# Patient Record
Sex: MALE | Race: WHITE | NOT HISPANIC OR LATINO | Employment: OTHER | URBAN - METROPOLITAN AREA
[De-identification: names, ages, dates, MRNs, and addresses within clinical notes are randomized per-mention and may not be internally consistent; named-entity substitution may affect disease eponyms.]

---

## 2017-05-24 RX ORDER — ISOSORBIDE MONONITRATE 30 MG/1
TABLET, EXTENDED RELEASE ORAL
Qty: 90 TABLET | Refills: 0 | Status: SHIPPED | OUTPATIENT
Start: 2017-05-24 | End: 2017-08-29 | Stop reason: SDUPTHER

## 2017-05-25 RX ORDER — PRAVASTATIN SODIUM 80 MG/1
TABLET ORAL
Qty: 30 TABLET | Refills: 1 | Status: SHIPPED | OUTPATIENT
Start: 2017-05-25 | End: 2019-06-07 | Stop reason: CLARIF

## 2017-09-01 ENCOUNTER — TELEPHONE (OUTPATIENT)
Dept: CARDIOLOGY | Facility: CLINIC | Age: 82
End: 2017-09-01

## 2017-09-01 RX ORDER — CLOPIDOGREL BISULFATE 75 MG/1
TABLET ORAL
Qty: 30 TABLET | Refills: 2 | Status: SHIPPED | OUTPATIENT
Start: 2017-09-01 | End: 2018-11-08

## 2017-09-01 RX ORDER — ISOSORBIDE MONONITRATE 30 MG/1
TABLET, EXTENDED RELEASE ORAL
Qty: 90 TABLET | Refills: 0 | Status: SHIPPED | OUTPATIENT
Start: 2017-09-01 | End: 2018-11-08 | Stop reason: ALTCHOICE

## 2017-09-01 RX ORDER — FENOFIBRATE 48 MG/1
TABLET, FILM COATED ORAL
Qty: 30 TABLET | Refills: 1 | Status: SHIPPED | OUTPATIENT
Start: 2017-09-01 | End: 2018-11-08

## 2017-09-01 NOTE — TELEPHONE ENCOUNTER
----- Message from Lucian Groves MD sent at 8/31/2017  4:54 PM CDT -----  Needs appt  ----- Message -----  From: Rajwinder Plascencia LPN  Sent: 8/31/2017   3:06 PM  To: Lucian Groves MD    Returning your call    ----- Message -----  From: Gris Flood  Sent: 8/31/2017  12:36 PM  To: [facility] SANTOS Staff    Returning your call.  Call [phone].

## 2017-09-13 ENCOUNTER — TELEPHONE (OUTPATIENT)
Dept: CARDIOLOGY | Facility: CLINIC | Age: 82
End: 2017-09-13

## 2017-09-13 DIAGNOSIS — Z00.00 ANNUAL PHYSICAL EXAM: Primary | ICD-10-CM

## 2017-09-13 NOTE — TELEPHONE ENCOUNTER
----- Message from Gage Figueroa sent at 9/13/2017 11:37 AM CDT -----  Contact: Rafael  Calling to discuss appointment on Monday and labs. He states he has not been able to get the orders. Please call back [phone] is his cell. Thanks!

## 2018-08-16 ENCOUNTER — TELEPHONE (OUTPATIENT)
Dept: CARDIOLOGY | Facility: CLINIC | Age: 83
End: 2018-08-16

## 2018-08-16 NOTE — TELEPHONE ENCOUNTER
----- Message from Gris Flood sent at 8/15/2018  4:12 PM CDT -----  Returning your call.  Please call patient at [phone].

## 2018-09-04 DIAGNOSIS — Z76.89 ESTABLISHING CARE WITH NEW DOCTOR, ENCOUNTER FOR: Primary | ICD-10-CM

## 2018-09-24 ENCOUNTER — TELEPHONE (OUTPATIENT)
Dept: CARDIOLOGY | Facility: CLINIC | Age: 83
End: 2018-09-24

## 2018-09-24 NOTE — TELEPHONE ENCOUNTER
----- Message from Augusto Elliott sent at 9/24/2018  2:49 PM CDT -----  Type: Needs Medical Advice    Who Called:  Patient    Best Call Back Number: [phone]  Additional Information: Patient calling.  States that he won't be able to make his 3:00 pm appointment on 9/24 and would like to reschedule.  Please call to advise

## 2018-09-24 NOTE — TELEPHONE ENCOUNTER
Rescheduled appt to 10/16 per pt cancelling appt for 9/24. Pt stated he would not make appt on time

## 2018-10-03 ENCOUNTER — TELEPHONE (OUTPATIENT)
Dept: CARDIOLOGY | Facility: CLINIC | Age: 83
End: 2018-10-03

## 2018-10-03 NOTE — TELEPHONE ENCOUNTER
Calling to reschedule appt per Dr Monroy. Provider not in clinic. No answer, left msg for pt to call office

## 2018-11-08 ENCOUNTER — OFFICE VISIT (OUTPATIENT)
Dept: CARDIOLOGY | Facility: CLINIC | Age: 83
End: 2018-11-08
Payer: MEDICARE

## 2018-11-08 VITALS
HEART RATE: 78 BPM | SYSTOLIC BLOOD PRESSURE: 126 MMHG | OXYGEN SATURATION: 89 % | BODY MASS INDEX: 25.97 KG/M2 | DIASTOLIC BLOOD PRESSURE: 60 MMHG | HEIGHT: 66 IN | WEIGHT: 161.63 LBS

## 2018-11-08 DIAGNOSIS — Z98.890 HISTORY OF STENT INSERTION OF RENAL ARTERY: ICD-10-CM

## 2018-11-08 DIAGNOSIS — R06.09 DOE (DYSPNEA ON EXERTION): ICD-10-CM

## 2018-11-08 DIAGNOSIS — Z95.1 S/P CABG X 3: ICD-10-CM

## 2018-11-08 DIAGNOSIS — G47.19 EXCESSIVE DAYTIME SLEEPINESS: ICD-10-CM

## 2018-11-08 DIAGNOSIS — E65 ABDOMINAL OBESITY: ICD-10-CM

## 2018-11-08 DIAGNOSIS — Z76.89 ESTABLISHING CARE WITH NEW DOCTOR, ENCOUNTER FOR: ICD-10-CM

## 2018-11-08 DIAGNOSIS — I10 ESSENTIAL HYPERTENSION: ICD-10-CM

## 2018-11-08 DIAGNOSIS — E11.9 TYPE 2 DIABETES MELLITUS WITHOUT COMPLICATION, WITHOUT LONG-TERM CURRENT USE OF INSULIN: ICD-10-CM

## 2018-11-08 DIAGNOSIS — R09.89 BILATERAL CAROTID BRUITS: ICD-10-CM

## 2018-11-08 DIAGNOSIS — Z95.0 CARDIAC PACEMAKER IN SITU: ICD-10-CM

## 2018-11-08 PROCEDURE — 99999 PR PBB SHADOW E&M-EST. PATIENT-LVL III: CPT | Mod: PBBFAC,,, | Performed by: INTERNAL MEDICINE

## 2018-11-08 PROCEDURE — 93000 ELECTROCARDIOGRAM COMPLETE: CPT | Mod: S$GLB,,, | Performed by: INTERNAL MEDICINE

## 2018-11-08 PROCEDURE — 1101F PT FALLS ASSESS-DOCD LE1/YR: CPT | Mod: CPTII,S$GLB,, | Performed by: INTERNAL MEDICINE

## 2018-11-08 PROCEDURE — 99205 OFFICE O/P NEW HI 60 MIN: CPT | Mod: S$GLB,,, | Performed by: INTERNAL MEDICINE

## 2018-11-08 RX ORDER — AMLODIPINE BESYLATE 5 MG/1
TABLET ORAL
COMMUNITY
End: 2018-11-08 | Stop reason: SDUPTHER

## 2018-11-08 RX ORDER — AMLODIPINE BESYLATE 5 MG/1
TABLET ORAL
Qty: 90 TABLET | Refills: 3 | Status: SHIPPED | OUTPATIENT
Start: 2018-11-08 | End: 2021-02-16 | Stop reason: SDUPTHER

## 2018-11-08 RX ORDER — INSULIN PUMP SYRINGE, 3 ML
EACH MISCELLANEOUS
COMMUNITY
End: 2019-06-07 | Stop reason: CLARIF

## 2018-11-08 RX ORDER — NAPROXEN SODIUM 220 MG/1
81 TABLET, FILM COATED ORAL DAILY
Qty: 90 TABLET | Refills: 3 | Status: SHIPPED | OUTPATIENT
Start: 2018-11-08 | End: 2020-11-18

## 2018-11-08 RX ORDER — METOPROLOL SUCCINATE 50 MG/1
50 TABLET, EXTENDED RELEASE ORAL DAILY
Qty: 90 TABLET | Refills: 3 | Status: SHIPPED | OUTPATIENT
Start: 2018-11-08 | End: 2019-06-07 | Stop reason: CLARIF

## 2018-11-08 RX ORDER — POTASSIUM CHLORIDE 750 MG/1
20 CAPSULE, EXTENDED RELEASE ORAL DAILY
COMMUNITY
End: 2021-02-16 | Stop reason: SDUPTHER

## 2018-11-08 NOTE — PROGRESS NOTES
Subjective:    Patient ID:  Rafael Bocanegra Jr. is a 84 y.o. male who presents for evaluation of Establish Care (CAD)  For CAD post CABG 2015, HTN, S/P PPM  PCP and referred by Dr. CHRISTIANO Roper, see biannually, do labs  Prior cardiologist: Beau Rivera and Germain, last seen over 2 years ago, no recent testing  CT surgeon: Dr. 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    WM figured he need cardiac follow up after over 2 years, no recent testing including pacer check. Concern of some swelling in the left leg and foot for a number of years, actually improved after CABG surgery. ECG shows AV sequential pacing, rate 63. No recent labs, compliant with medications, on only ASA 5 gr daily for antiplatelet Rx and moderated dose statin, 80 mg of Pravastatin. No problem with medications. Exercise regularly, do mental and physical daily for 15-20 minutes, isometric and isokinetic. No CP only rarely and brief, also some CUELLAR with stair climbing, or steep incline. Medications reviewed, on 2 BB, Bystolic and Toprol.        Review of Systems   Constitution: Positive for malaise/fatigue and night sweats. Negative for diaphoresis, fever, weakness and weight gain.   HENT: Positive for hearing loss (bilateral). Negative for nosebleeds and tinnitus.    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 Negative for visual disturbance.   Cardiovascular: Positive for dyspnea on exertion and leg swelling. Negative for chest pain, claudication, cyanosis, irregular heartbeat, near-syncope, orthopnea, palpitations and paroxysmal nocturnal dyspnea.   Respiratory: Positive for snoring. Negative for cough, shortness of breath, sleep disturbances due to breathing and wheezing.         Newry score 13   Endocrine: Positive for cold intolerance. Negative for  "polydipsia and polyuria.   Hematologic/Lymphatic: Does not bruise/bleed easily.   Skin: Negative for color change, flushing, nail changes, poor wound healing and suspicious lesions.   Musculoskeletal: Positive for arthritis, back pain, falls, joint pain, neck pain and stiffness. Negative for gout, joint swelling, muscle cramps, muscle weakness and myalgias.   Gastrointestinal: Positive for flatus and hemorrhoids. Negative for heartburn, hematemesis, hematochezia, melena and nausea.   Genitourinary: Positive for bladder incontinence.   Neurological: Positive for difficulty with concentration, excessive daytime sleepiness and headaches. Negative for disturbances in coordination, dizziness, focal weakness, light-headedness, loss of balance, numbness and vertigo.   Psychiatric/Behavioral: Positive for depression and memory loss. Negative for substance abuse. The patient does not have insomnia and is not nervous/anxious.         Objective:    Physical Exam   Constitutional: He is oriented to person, place, and time. He appears well-developed and well-nourished.   HENT:   Head: Normocephalic.   Eyes: Conjunctivae and EOM are normal. Pupils are equal, round, and reactive to light.   Neck: Normal range of motion. Neck supple. No JVD present. No thyromegaly present.   Circumference 14.5"   Cardiovascular: Normal rate, regular rhythm, normal heart sounds and intact distal pulses. Exam reveals no gallop and no friction rub.   No murmur heard.  Pulses:       Carotid pulses are 2+ on the right side with bruit, and 2+ on the left side with bruit.       Radial pulses are 2+ on the right side, and 2+ on the left side.        Femoral pulses are 2+ on the right side, and 2+ on the left side.       Popliteal pulses are 2+ on the right side, and 2+ on the left side.        Dorsalis pedis pulses are 2+ on the right side, and 2+ on the left side.        Posterior tibial pulses are 2+ on the right side, and 2+ on the left side. " "  Pulmonary/Chest: Effort normal and breath sounds normal. He has no rales. He exhibits no tenderness.   Abdominal: Soft. Bowel sounds are normal. There is no tenderness.   Waist 41.5", hip 40"   Musculoskeletal: Normal range of motion. He exhibits edema (left LE 1+ pitting edema).   Lymphadenopathy:     He has no cervical adenopathy.   Neurological: He is alert and oriented to person, place, and time.   Skin: Skin is warm and dry. No rash noted.         Assessment:       1. Establishing care with new doctor, encounter for    2. Cardiac pacemaker in situ, implanted 9/27/2013, Lon Garzon DR    3. Type 2 diabetes mellitus without complication, without long-term current use of insulin, onset 2010    4. Essential hypertension, onset 1968    5. S/P CABG x 3, 2015, at [facility]    6. History of stent insertion of renal artery, 2016, left    7. Excessive daytime sleepiness    8. Abdominal obesity    9. Bilateral carotid bruits    10. CUELLAR (dyspnea on exertion)         Plan:       Rafael was seen today for establish care.    Diagnoses and all orders for this visit:    Establishing care with new doctor, encounter for  -     EKG 12-lead    Cardiac pacemaker in situ, implanted 9/27/2013, Lon Garzon DR  -     Pacemaker Interrogation; Future    Type 2 diabetes mellitus without complication, without long-term current use of insulin, onset 2010    Essential hypertension, onset 1968  -     metoprolol succinate (TOPROL-XL) 50 MG 24 hr tablet; Take 1 tablet (50 mg total) by mouth once daily.  -     amLODIPine (NORVASC) 5 MG tablet; amlodipine 5 mg tablet   Take 1 tablet every day by oral route as directed.  -     Transthoracic echo (TTE) complete (Cupid Only); Future    S/P CABG x 3, 2015, at [facility]  -     aspirin 81 MG Chew; Take 1 tablet (81 mg total) by mouth once daily.  -     Transthoracic echo (TTE) complete (Cupid Only); Future  -     Stress test with myocardial perfusion (Cupid Only); Future    History of stent insertion of renal " artery, 2016, left  -     aspirin 81 MG Chew; Take 1 tablet (81 mg total) by mouth once daily.    Excessive daytime sleepiness  -     Polysomnogram (CPAP will be added if patient meets diagnostic criteria.); Future    Abdominal obesity    Bilateral carotid bruits  -     US Carotid Bilateral; Future  -     CAR Ultrasound doppler carotid bliateral; Future  -     Stress test with myocardial perfusion (Cupid Only); Future    CUELLAR (dyspnea on exertion)  -     Transthoracic echo (TTE) complete (Cupid Only); Future  -     Stress test with myocardial perfusion (Cupid Only); Future    - All medical issues reviewed, continue current Rx.  - Will get lipid panel and A1C from Dr. Roper  - CV status stable, continue current Rx, all medications reviewed, patient acknowledge good understanding.  - Instruction for Mediterranean diet and heart healthy exercise given.  - Check home blood pressure, 2 days weekly, do 2 readings within 5 minutes in AM and PM, keep log for review.  - Weigh twice weekly, try to lose 1-2 lbs per week  - Highly recommend 30 minutes of exercise daily, can have Sunday off, with 2-3 sessions of muscle strengthening weekly. A  would be very helpful.  - Recommend at least annual cardiovascular evaluation in view of his significant risk factors.  - Phone review / encourage use of [facility]      Total face-to-face time with the patient was 50 minutes and greater than 50% was spent in counseling and coordination of care. The above assessment and plan have been discussed at length. Problem List updated. Asked to bring in all active medications / pills bottles with next visit.

## 2018-11-08 NOTE — PATIENT INSTRUCTIONS

## 2019-06-07 ENCOUNTER — HOSPITAL ENCOUNTER (OUTPATIENT)
Facility: HOSPITAL | Age: 84
Discharge: HOME | End: 2019-06-09
Attending: EMERGENCY MEDICINE | Admitting: INTERNAL MEDICINE
Payer: MEDICARE

## 2019-06-07 DIAGNOSIS — I63.9 CVA (CEREBRAL VASCULAR ACCIDENT): Primary | ICD-10-CM

## 2019-06-07 DIAGNOSIS — R07.9 CHEST PAIN: ICD-10-CM

## 2019-06-07 DIAGNOSIS — R53.1 LEFT-SIDED WEAKNESS: ICD-10-CM

## 2019-06-07 PROBLEM — E11.59 HYPERTENSION ASSOCIATED WITH DIABETES: Status: ACTIVE | Noted: 2018-11-08

## 2019-06-07 PROBLEM — I15.2 HYPERTENSION ASSOCIATED WITH DIABETES: Status: ACTIVE | Noted: 2018-11-08

## 2019-06-07 PROBLEM — N18.30 CKD (CHRONIC KIDNEY DISEASE) STAGE 3, GFR 30-59 ML/MIN: Status: ACTIVE | Noted: 2019-06-07

## 2019-06-07 PROBLEM — N18.30 TYPE 2 DIABETES MELLITUS WITH STAGE 3 CHRONIC KIDNEY DISEASE, WITHOUT LONG-TERM CURRENT USE OF INSULIN: Status: ACTIVE | Noted: 2018-11-08

## 2019-06-07 PROBLEM — I25.10 CAD (CORONARY ARTERY DISEASE): Status: ACTIVE | Noted: 2019-06-07

## 2019-06-07 PROBLEM — E11.22 TYPE 2 DIABETES MELLITUS WITH STAGE 3 CHRONIC KIDNEY DISEASE, WITHOUT LONG-TERM CURRENT USE OF INSULIN: Status: ACTIVE | Noted: 2018-11-08

## 2019-06-07 LAB
ANION GAP SERPL CALC-SCNC: 10 MMOL/L (ref 8–16)
BASOPHILS # BLD AUTO: 0 K/UL (ref 0–0.2)
BASOPHILS NFR BLD: 0.4 % (ref 0–1.9)
BUN SERPL-MCNC: 28 MG/DL (ref 8–23)
CALCIUM SERPL-MCNC: 9.4 MG/DL (ref 8.7–10.5)
CHLORIDE SERPL-SCNC: 106 MMOL/L (ref 95–110)
CO2 SERPL-SCNC: 26 MMOL/L (ref 23–29)
CREAT SERPL-MCNC: 1.2 MG/DL (ref 0.5–1.4)
DIFFERENTIAL METHOD: ABNORMAL
EOSINOPHIL # BLD AUTO: 0.2 K/UL (ref 0–0.5)
EOSINOPHIL NFR BLD: 2.5 % (ref 0–8)
ERYTHROCYTE [DISTWIDTH] IN BLOOD BY AUTOMATED COUNT: 12.8 % (ref 11.5–14.5)
EST. GFR  (AFRICAN AMERICAN): >60 ML/MIN/1.73 M^2
EST. GFR  (NON AFRICAN AMERICAN): 55 ML/MIN/1.73 M^2
GLUCOSE SERPL-MCNC: 84 MG/DL (ref 70–110)
HCT VFR BLD AUTO: 37.2 % (ref 40–54)
HGB BLD-MCNC: 12.7 G/DL (ref 14–18)
LYMPHOCYTES # BLD AUTO: 1 K/UL (ref 1–4.8)
LYMPHOCYTES NFR BLD: 14.6 % (ref 18–48)
MCH RBC QN AUTO: 31.9 PG (ref 27–31)
MCHC RBC AUTO-ENTMCNC: 34.3 G/DL (ref 32–36)
MCV RBC AUTO: 93 FL (ref 82–98)
MONOCYTES # BLD AUTO: 0.3 K/UL (ref 0.3–1)
MONOCYTES NFR BLD: 4 % (ref 4–15)
NEUTROPHILS # BLD AUTO: 5.6 K/UL (ref 1.8–7.7)
NEUTROPHILS NFR BLD: 78.5 % (ref 38–73)
PLATELET # BLD AUTO: 153 K/UL (ref 150–350)
PMV BLD AUTO: 9.9 FL (ref 9.2–12.9)
POTASSIUM SERPL-SCNC: 3.8 MMOL/L (ref 3.5–5.1)
RBC # BLD AUTO: 3.99 M/UL (ref 4.6–6.2)
SODIUM SERPL-SCNC: 142 MMOL/L (ref 136–145)
TROPONIN I SERPL DL<=0.01 NG/ML-MCNC: 0.01 NG/ML (ref 0–0.03)
TROPONIN I SERPL DL<=0.01 NG/ML-MCNC: 0.01 NG/ML (ref 0–0.03)
WBC # BLD AUTO: 7.2 K/UL (ref 3.9–12.7)

## 2019-06-07 PROCEDURE — 83036 HEMOGLOBIN GLYCOSYLATED A1C: CPT

## 2019-06-07 PROCEDURE — 93005 ELECTROCARDIOGRAM TRACING: CPT

## 2019-06-07 PROCEDURE — 25000003 PHARM REV CODE 250: Performed by: EMERGENCY MEDICINE

## 2019-06-07 PROCEDURE — 99285 EMERGENCY DEPT VISIT HI MDM: CPT | Mod: 25

## 2019-06-07 PROCEDURE — 36415 COLL VENOUS BLD VENIPUNCTURE: CPT

## 2019-06-07 PROCEDURE — G0378 HOSPITAL OBSERVATION PER HR: HCPCS

## 2019-06-07 PROCEDURE — 85025 COMPLETE CBC W/AUTO DIFF WBC: CPT

## 2019-06-07 PROCEDURE — 84484 ASSAY OF TROPONIN QUANT: CPT | Mod: 91

## 2019-06-07 PROCEDURE — 94760 N-INVAS EAR/PLS OXIMETRY 1: CPT

## 2019-06-07 PROCEDURE — 80048 BASIC METABOLIC PNL TOTAL CA: CPT

## 2019-06-07 RX ORDER — GLUCAGON 1 MG
1 KIT INJECTION
Status: DISCONTINUED | OUTPATIENT
Start: 2019-06-08 | End: 2019-06-09 | Stop reason: HOSPADM

## 2019-06-07 RX ORDER — PRAVASTATIN SODIUM 20 MG/1
20 TABLET ORAL DAILY
COMMUNITY
End: 2021-02-16 | Stop reason: SDUPTHER

## 2019-06-07 RX ORDER — CARVEDILOL 6.25 MG/1
6.25 TABLET ORAL 2 TIMES DAILY WITH MEALS
Status: DISCONTINUED | OUTPATIENT
Start: 2019-06-07 | End: 2019-06-09 | Stop reason: HOSPADM

## 2019-06-07 RX ORDER — EZETIMIBE 10 MG/1
10 TABLET ORAL DAILY
COMMUNITY
End: 2021-02-16 | Stop reason: SDUPTHER

## 2019-06-07 RX ORDER — ISOSORBIDE MONONITRATE 30 MG/1
30 TABLET, EXTENDED RELEASE ORAL DAILY
Status: DISCONTINUED | OUTPATIENT
Start: 2019-06-08 | End: 2019-06-09 | Stop reason: HOSPADM

## 2019-06-07 RX ORDER — PANTOPRAZOLE SODIUM 40 MG/1
40 TABLET, DELAYED RELEASE ORAL DAILY
Status: DISCONTINUED | OUTPATIENT
Start: 2019-06-08 | End: 2019-06-09 | Stop reason: HOSPADM

## 2019-06-07 RX ORDER — SODIUM CHLORIDE 0.9 % (FLUSH) 0.9 %
10 SYRINGE (ML) INJECTION
Status: DISCONTINUED | OUTPATIENT
Start: 2019-06-07 | End: 2019-06-09 | Stop reason: HOSPADM

## 2019-06-07 RX ORDER — ISOSORBIDE MONONITRATE 30 MG/1
30 TABLET, EXTENDED RELEASE ORAL DAILY
COMMUNITY
End: 2021-02-16 | Stop reason: SDUPTHER

## 2019-06-07 RX ORDER — INSULIN ASPART 100 [IU]/ML
0-5 INJECTION, SOLUTION INTRAVENOUS; SUBCUTANEOUS
Status: DISCONTINUED | OUTPATIENT
Start: 2019-06-08 | End: 2019-06-09 | Stop reason: HOSPADM

## 2019-06-07 RX ORDER — SERTRALINE HYDROCHLORIDE 50 MG/1
200 TABLET, FILM COATED ORAL DAILY
Status: DISCONTINUED | OUTPATIENT
Start: 2019-06-08 | End: 2019-06-09 | Stop reason: HOSPADM

## 2019-06-07 RX ORDER — CARVEDILOL 6.25 MG/1
6.25 TABLET ORAL 2 TIMES DAILY WITH MEALS
COMMUNITY
End: 2021-02-16 | Stop reason: SDUPTHER

## 2019-06-07 RX ORDER — NAPROXEN SODIUM 220 MG/1
81 TABLET, FILM COATED ORAL DAILY
Status: DISCONTINUED | OUTPATIENT
Start: 2019-06-08 | End: 2019-06-09

## 2019-06-07 RX ORDER — IBUPROFEN 200 MG
24 TABLET ORAL
Status: DISCONTINUED | OUTPATIENT
Start: 2019-06-08 | End: 2019-06-09 | Stop reason: HOSPADM

## 2019-06-07 RX ORDER — IBUPROFEN 200 MG
16 TABLET ORAL
Status: DISCONTINUED | OUTPATIENT
Start: 2019-06-08 | End: 2019-06-09 | Stop reason: HOSPADM

## 2019-06-07 RX ORDER — PRAVASTATIN SODIUM 10 MG/1
20 TABLET ORAL DAILY
Status: DISCONTINUED | OUTPATIENT
Start: 2019-06-08 | End: 2019-06-09 | Stop reason: HOSPADM

## 2019-06-07 RX ADMIN — CARVEDILOL 6.25 MG: 6.25 TABLET, FILM COATED ORAL at 09:06

## 2019-06-07 NOTE — ED PROVIDER NOTES
Encounter Date: 6/7/2019    SCRIBE #1 NOTE: I, Jahairachen Guzman, am scribing for, and in the presence of, Diomedes Caro MD.       History     Chief Complaint   Patient presents with    Chest Pain     L arm numbness, L leg weakness today. Some relief of pain with NTG       Time seen by provider: 4:27 PM on 06/07/2019    Rafael Bocanegra Jr. is a 85 y.o. male with a PMHx of DM, CAD, and HTN who presents to the ED with an onset of left arm and leg weakness/numbness, and chest pain which began several weeks ago. Pt also c/o nausea today. He states that it is difficult for him to move his arm and leg. Pt also c/o having waxing and waning chest pain that has been ongoing for two weeks that last for approximately 2 minutes. He states that the chest pain occurs in his left shoulder blade and rib cage. He also complains of SOB secondary to the chest pain. He mentions that walking is difficult secondary to leg weakness. Pt mentions his last dose of NTG was 5 hours ago and did not take any aspirin today. Pt denies having prior left arm weakness. Pt states that his PCP, Dr. Roper, told him to come to the ED to have his symptoms evaluated. Pt denies diaphoresis, vomiting, abdominal pain, back pain, facial weakness, or any other symptoms at this time. Pertinent PSHx of a pacemaker, CABG (performed in 2015), stents (placed 2016)  noted. NKDA noted.          The history is provided by the patient.     Review of patient's allergies indicates:  No Known Allergies  Past Medical History:   Diagnosis Date    Anemia     Anticoagulant long-term use     Arthritis     Coronary artery disease     Diabetes mellitus     Encounter for blood transfusion     Hypertension     Kidney disease, chronic, stage III (GFR 30-59 ml/min)      Past Surgical History:   Procedure Laterality Date    carotid endartarectomy      left    COLONOSCOPY N/A 1/17/2014    [facility]    COLONOSCOPY N/A 6/29/2012     [facility]    CORONARY ARTERY BYPASS GRAFT      CORONARY STENT PLACEMENT      EGD (ESOPHAGOGASTRODUODENOSCOPY) N/A 2012    [facility]    INSERT / REPLACE / REMOVE PACEMAKER       Family History   Problem Relation Age of Onset    Cancer Sister      Social History     Tobacco Use    Smoking status: Former Smoker     Packs/day: 2.00     Years: 20.00     Pack years: 40.00     Last attempt to quit: 1975     Years since quittin.9    Smokeless tobacco: Never Used   Substance Use Topics    Alcohol use: No    Drug use: No     Review of Systems   Constitutional: Negative for diaphoresis and fever.   HENT: Negative for sore throat.    Respiratory: Positive for shortness of breath (secondary to chest pain).    Cardiovascular: Positive for chest pain (waxing and waning).   Gastrointestinal: Positive for nausea. Negative for abdominal pain and vomiting.   Genitourinary: Negative for dysuria.   Musculoskeletal: Negative for back pain.   Skin: Negative for rash.   Neurological: Positive for weakness (left leg and left arm) and numbness (left leg and left arm). Negative for facial asymmetry.   Hematological: Does not bruise/bleed easily.       Physical Exam     Initial Vitals [19 1604]   BP Pulse Resp Temp SpO2   (!) 180/75 76 16 98.1 °F (36.7 °C) 97 %      MAP       --         Physical Exam    Nursing note and vitals reviewed.  Constitutional: He appears well-developed and well-nourished. He is not diaphoretic. No distress.   HENT:   Head: Normocephalic and atraumatic.   Nose: Nose normal.   Mouth/Throat: Oropharynx is clear and moist.   Eyes: Conjunctivae and EOM are normal. Pupils are equal, round, and reactive to light. No scleral icterus.   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 Poor vision in right eye which is old.    Neck: Normal range of motion. Neck supple. No JVD present.   Cardiovascular: Normal rate,  regular rhythm and normal heart sounds. Exam reveals no gallop and no friction rub.    No murmur heard.  Pulses:       Radial pulses are 2+ on the right side, and 2+ on the left side.        Dorsalis pedis pulses are 2+ on the right side, and 2+ on the left side.        Posterior tibial pulses are 2+ on the right side, and 2+ on the left side.   Pulmonary/Chest: Breath sounds normal. No stridor. No respiratory distress. He has no wheezes. He exhibits no tenderness.   Abdominal: Soft. Bowel sounds are normal. He exhibits no distension and no mass. There is no tenderness. There is no rebound and no guarding.   Musculoskeletal: Normal range of motion. He exhibits no edema or tenderness.        Cervical back: Normal.        Thoracic back: Normal.        Lumbar back: Normal.        Right lower leg: He exhibits no edema.        Left lower leg: He exhibits no edema.   No peripheral edema.   Lymphadenopathy:     He has no cervical adenopathy.   Neurological: He is alert and oriented to person, place, and time. He has normal strength. No cranial nerve deficit.   No focal neurological deficits noted. Cranial nerves III-XII grossly intact. Equal, rapid alternating movements noted to bilateral upper and lower extremities.   Mild left arm pronator drift without falling to the bed. Left leg drift with falling to the bed. Decreased subjective sensation of light touch to left arm and leg. Normal speech.   Skin: Skin is warm and dry. No rash noted. No pallor.   Psychiatric: He has a normal mood and affect. Thought content normal.         ED Course   Procedures  Labs Reviewed   CBC W/ AUTO DIFFERENTIAL - Abnormal; Notable for the following components:       Result Value    RBC 3.99 (*)     Hemoglobin 12.7 (*)     Hematocrit 37.2 (*)     Mean Corpuscular Hemoglobin 31.9 (*)     Gran% 78.5 (*)     Lymph% 14.6 (*)     All other components within normal limits   BASIC METABOLIC PANEL - Abnormal; Notable for the following components:     BUN, Bld 28 (*)     eGFR if non  55 (*)     All other components within normal limits   TROPONIN I          Imaging Results          CT Head Without Contrast (Final result)  Result time 06/07/19 17:37:52    Final result by Devon Pritchard MD (06/07/19 17:37:52)                 Impression:      No acute intracranial process.  Mild chronic white matter change.  Atherosclerosis.      Electronically signed by: Devon Pritchard MD  Date:    06/07/2019  Time:    17:37             Narrative:    EXAMINATION:  CT HEAD WITHOUT CONTRAST    CLINICAL HISTORY:  Focal neuro deficit, new, fixed or worsening, >6 hours;    TECHNIQUE:  Low dose axial images were obtained through the head.  Coronal and sagittal reformations were also performed. Contrast was not administered.    COMPARISON:  None.    FINDINGS:  Gray-white differentiation is well maintained.  There is no intracranial hemorrhage.  There is no mass or midline shift.  The ventricles are nondilated.  There is mild patchy white matter hypoattenuation which is typically chronic.  Mild generalized cerebral volume loss is present.  There is heavy calcification of the intracranial ICAs.  The calvarium is intact.                               X-Ray Chest 1 View (Final result)  Result time 06/07/19 17:03:46    Final result by Devon Pritchard MD (06/07/19 17:03:46)                 Impression:      Left pacer, CABG, and cardiomegaly.  Mild right basilar atelectasis.      Electronically signed by: Devon Pritchard MD  Date:    06/07/2019  Time:    17:03             Narrative:    EXAMINATION:  XR CHEST 1 VIEW    CLINICAL HISTORY:  CP;    TECHNIQUE:  Single frontal view of the chest was performed.    COMPARISON:  None    FINDINGS:  A left-sided triple lead cardiac pacer is present.  There are postoperative changes of CABG.  The heart is enlarged.  There is mild right basilar atelectasis.  No consolidation or pleural effusion.                             (radiology reading, visualized by me)     Medical Decision Making:   History:   Old Medical Records: I decided to obtain old medical records.  Clinical Tests:   Lab Tests: Ordered and Reviewed  Radiological Study: Ordered and Reviewed  Medical Tests: Ordered and Reviewed  Other:   I have discussed this case with another health care provider.            Scribe Attestation:   Scribe #1: I performed the above scribed service and the documentation accurately describes the services I performed. I attest to the accuracy of the note.    I, Dr. Diomedes Caro, personally performed the services described in this documentation. All medical record entries made by the scribe were at my direction and in my presence.  I have reviewed the chart and agree that the record reflects my personal performance and is accurate and complete. Diomedes Caro MD.  9:34 PM 06/07/2019    Rafael Bocanegra Jr. is a 85 y.o. male presenting with multiple complaints all subacute in timing of several weeks of vague chest pressure along with left-sided weakness and numbness.  Patient is not a tPA or intravascular candidate given protracted nature with gradual onset of symptoms. I did speak with Cardiology who does not feel the patient requires transfer for proximity to cardiac catheterization lab.  I feel this is reasonable in this was also discussed with the patient.  EKG unchanged and troponin notably is negative.  I have low suspicion for PE.  Aortic dissection unlikely given time course and I do not think CT angiography of the chest is indicated.  He does have apparent left-sided weakness objective on exam and would benefit from Neurology evaluation.  Head CT shows no sign of intracranial lesion or hemorrhage. I have discussed with hospital medicine who will assume care.        ED Course as of Jun 07 1829 Fri Jun 07, 2019   1625 EKG:  AV dual-paced rhythm similar to prior, rate of 78, wide QRS.  Negative Sgarbossa criteria for ACS with  appropriate discordance.  R axis.    [MR]   1700 CXR:  Pacer in place, NAD, narrow mediastinum. (my read)    [MR]   1812 D/w Dr. Dudley who recommends no need for transfer for immediate cardiac catheterization plan.  Admit here for medical workup with hospital medicine, neuro.    [MR]      ED Course User Index  [MR] Diomedes Caro MD     Clinical Impression:       ICD-10-CM ICD-9-CM   1. Left-sided weakness R53.1 728.87   2. Chest pain R07.9 786.50         Disposition:   Disposition: Admitted                        Diomedes Caro MD  06/07/19 [id number]

## 2019-06-08 LAB
ALBUMIN SERPL BCP-MCNC: 3.6 G/DL (ref 3.5–5.2)
ALP SERPL-CCNC: 59 U/L (ref 55–135)
ALT SERPL W/O P-5'-P-CCNC: 22 U/L (ref 10–44)
ANION GAP SERPL CALC-SCNC: 12 MMOL/L (ref 8–16)
APTT BLDCRRT: 30.8 SEC (ref 21–32)
AST SERPL-CCNC: 22 U/L (ref 10–40)
BASOPHILS # BLD AUTO: 0 K/UL (ref 0–0.2)
BASOPHILS NFR BLD: 0.2 % (ref 0–1.9)
BILIRUB SERPL-MCNC: 0.5 MG/DL (ref 0.1–1)
BILIRUB UR QL STRIP: NEGATIVE
BUN SERPL-MCNC: 23 MG/DL (ref 8–23)
CALCIUM SERPL-MCNC: 9.5 MG/DL (ref 8.7–10.5)
CHLORIDE SERPL-SCNC: 108 MMOL/L (ref 95–110)
CHOLEST SERPL-MCNC: 183 MG/DL (ref 120–199)
CHOLEST/HDLC SERPL: 4.5 {RATIO} (ref 2–5)
CK MB SERPL-MCNC: 2.3 NG/ML (ref 0.1–6.5)
CK MB SERPL-RTO: 2.6 % (ref 0–5)
CK SERPL-CCNC: 88 U/L (ref 20–200)
CLARITY UR: CLEAR
CO2 SERPL-SCNC: 24 MMOL/L (ref 23–29)
COLOR UR: YELLOW
CREAT SERPL-MCNC: 1 MG/DL (ref 0.5–1.4)
DIFFERENTIAL METHOD: ABNORMAL
EOSINOPHIL # BLD AUTO: 0.2 K/UL (ref 0–0.5)
EOSINOPHIL NFR BLD: 2.6 % (ref 0–8)
ERYTHROCYTE [DISTWIDTH] IN BLOOD BY AUTOMATED COUNT: 13 % (ref 11.5–14.5)
EST. GFR  (AFRICAN AMERICAN): >60 ML/MIN/1.73 M^2
EST. GFR  (NON AFRICAN AMERICAN): >60 ML/MIN/1.73 M^2
ESTIMATED AVG GLUCOSE: 128 MG/DL (ref 68–131)
GLUCOSE SERPL-MCNC: 88 MG/DL (ref 70–110)
GLUCOSE UR QL STRIP: NEGATIVE
HBA1C MFR BLD HPLC: 6.1 % (ref 4–5.6)
HCT VFR BLD AUTO: 41.5 % (ref 40–54)
HDLC SERPL-MCNC: 41 MG/DL (ref 40–75)
HDLC SERPL: 22.4 % (ref 20–50)
HGB BLD-MCNC: 13.8 G/DL (ref 14–18)
HGB UR QL STRIP: NEGATIVE
INR PPP: 1 (ref 0.8–1.2)
KETONES UR QL STRIP: NEGATIVE
LDLC SERPL CALC-MCNC: 112.4 MG/DL (ref 63–159)
LEUKOCYTE ESTERASE UR QL STRIP: NEGATIVE
LYMPHOCYTES # BLD AUTO: 1.2 K/UL (ref 1–4.8)
LYMPHOCYTES NFR BLD: 13 % (ref 18–48)
MAGNESIUM SERPL-MCNC: 1.9 MG/DL (ref 1.6–2.6)
MCH RBC QN AUTO: 31.5 PG (ref 27–31)
MCHC RBC AUTO-ENTMCNC: 33.3 G/DL (ref 32–36)
MCV RBC AUTO: 95 FL (ref 82–98)
MONOCYTES # BLD AUTO: 0.4 K/UL (ref 0.3–1)
MONOCYTES NFR BLD: 4 % (ref 4–15)
NEUTROPHILS # BLD AUTO: 7.4 K/UL (ref 1.8–7.7)
NEUTROPHILS NFR BLD: 80.2 % (ref 38–73)
NITRITE UR QL STRIP: NEGATIVE
NONHDLC SERPL-MCNC: 142 MG/DL
PH UR STRIP: 8 [PH] (ref 5–8)
PHOSPHATE SERPL-MCNC: 2.6 MG/DL (ref 2.7–4.5)
PLATELET # BLD AUTO: 159 K/UL (ref 150–350)
PMV BLD AUTO: 10.1 FL (ref 9.2–12.9)
POCT GLUCOSE: 120 MG/DL (ref 70–110)
POCT GLUCOSE: 141 MG/DL (ref 70–110)
POTASSIUM SERPL-SCNC: 3.6 MMOL/L (ref 3.5–5.1)
PROT SERPL-MCNC: 6.7 G/DL (ref 6–8.4)
PROT UR QL STRIP: NEGATIVE
PROTHROMBIN TIME: 10.7 SEC (ref 9–12.5)
RBC # BLD AUTO: 4.39 M/UL (ref 4.6–6.2)
SODIUM SERPL-SCNC: 144 MMOL/L (ref 136–145)
SP GR UR STRIP: 1.01 (ref 1–1.03)
TRIGL SERPL-MCNC: 148 MG/DL (ref 30–150)
TROPONIN I SERPL DL<=0.01 NG/ML-MCNC: 0.02 NG/ML (ref 0–0.03)
TSH SERPL DL<=0.005 MIU/L-ACNC: 3 UIU/ML (ref 0.4–4)
URN SPEC COLLECT METH UR: NORMAL
UROBILINOGEN UR STRIP-ACNC: NEGATIVE EU/DL
WBC # BLD AUTO: 9.3 K/UL (ref 3.9–12.7)

## 2019-06-08 PROCEDURE — 99900039 HC SLP GENERIC THERAPY SCREENING (STAT)

## 2019-06-08 PROCEDURE — 92610 EVALUATE SWALLOWING FUNCTION: CPT

## 2019-06-08 PROCEDURE — G9168 MEMORY CURRENT STATUS: HCPCS | Mod: CJ

## 2019-06-08 PROCEDURE — G0378 HOSPITAL OBSERVATION PER HR: HCPCS

## 2019-06-08 PROCEDURE — 25000003 PHARM REV CODE 250: Performed by: EMERGENCY MEDICINE

## 2019-06-08 PROCEDURE — G8996 SWALLOW CURRENT STATUS: HCPCS | Mod: CH

## 2019-06-08 PROCEDURE — 84100 ASSAY OF PHOSPHORUS: CPT

## 2019-06-08 PROCEDURE — 25500020 PHARM REV CODE 255: Performed by: HOSPITALIST

## 2019-06-08 PROCEDURE — 82550 ASSAY OF CK (CPK): CPT

## 2019-06-08 PROCEDURE — 85610 PROTHROMBIN TIME: CPT

## 2019-06-08 PROCEDURE — 97802 MEDICAL NUTRITION INDIV IN: CPT

## 2019-06-08 PROCEDURE — 94761 N-INVAS EAR/PLS OXIMETRY MLT: CPT

## 2019-06-08 PROCEDURE — 83735 ASSAY OF MAGNESIUM: CPT

## 2019-06-08 PROCEDURE — 36415 COLL VENOUS BLD VENIPUNCTURE: CPT

## 2019-06-08 PROCEDURE — G9170 MEMORY D/C STATUS: HCPCS | Mod: CJ

## 2019-06-08 PROCEDURE — 80053 COMPREHEN METABOLIC PANEL: CPT

## 2019-06-08 PROCEDURE — G9169 MEMORY GOAL STATUS: HCPCS | Mod: CJ

## 2019-06-08 PROCEDURE — 25000003 PHARM REV CODE 250: Performed by: NURSE PRACTITIONER

## 2019-06-08 PROCEDURE — 84443 ASSAY THYROID STIM HORMONE: CPT

## 2019-06-08 PROCEDURE — 85025 COMPLETE CBC W/AUTO DIFF WBC: CPT

## 2019-06-08 PROCEDURE — G8997 SWALLOW GOAL STATUS: HCPCS | Mod: CH

## 2019-06-08 PROCEDURE — 81003 URINALYSIS AUTO W/O SCOPE: CPT

## 2019-06-08 PROCEDURE — 92523 SPEECH SOUND LANG COMPREHEN: CPT

## 2019-06-08 PROCEDURE — G8998 SWALLOW D/C STATUS: HCPCS | Mod: CH

## 2019-06-08 PROCEDURE — 82553 CREATINE MB FRACTION: CPT

## 2019-06-08 PROCEDURE — 84484 ASSAY OF TROPONIN QUANT: CPT

## 2019-06-08 PROCEDURE — 80061 LIPID PANEL: CPT

## 2019-06-08 PROCEDURE — 85730 THROMBOPLASTIN TIME PARTIAL: CPT

## 2019-06-08 RX ADMIN — MULTIPLE VITAMINS W/ MINERALS TAB 1 TABLET: TAB at 09:06

## 2019-06-08 RX ADMIN — SERTRALINE HYDROCHLORIDE 200 MG: 50 TABLET ORAL at 09:06

## 2019-06-08 RX ADMIN — IOHEXOL 75 ML: 350 INJECTION, SOLUTION INTRAVENOUS at 12:06

## 2019-06-08 RX ADMIN — PRAVASTATIN SODIUM 20 MG: 10 TABLET ORAL at 09:06

## 2019-06-08 RX ADMIN — PANTOPRAZOLE SODIUM 40 MG: 40 TABLET, DELAYED RELEASE ORAL at 09:06

## 2019-06-08 RX ADMIN — ISOSORBIDE MONONITRATE 30 MG: 30 TABLET, EXTENDED RELEASE ORAL at 09:06

## 2019-06-08 RX ADMIN — CARVEDILOL 6.25 MG: 6.25 TABLET, FILM COATED ORAL at 04:06

## 2019-06-08 RX ADMIN — CARVEDILOL 6.25 MG: 6.25 TABLET, FILM COATED ORAL at 09:06

## 2019-06-08 RX ADMIN — ASPIRIN 81 MG CHEWABLE TABLET 81 MG: 81 TABLET CHEWABLE at 09:06

## 2019-06-08 NOTE — ASSESSMENT & PLAN NOTE
Chronic, uncontrolled.  Latest blood pressure and vitals reviewed-   Temp:  [96.4 °F (35.8 °C)-98.1 °F (36.7 °C)]   Pulse:  [76-99]   Resp:  [16-17]   BP: (161-204)/(64-96)   SpO2:  [90 %-99 %] .   Allow for permissive hypertension until stroke ruled out.

## 2019-06-08 NOTE — ASSESSMENT & PLAN NOTE
Chronic/Uncontrolled  Allow for permissive HTN, SBP <220, until CVA is ruled out  Hold chronic medications for now

## 2019-06-08 NOTE — PT/OT/SLP PROGRESS
Physical Therapy      Patient Name:  Rafael Bocanegra Jr.   MRN:  [id number]    PT eval attempted- pt refusing PT services. Explained purpose of therapy eval on stroke pt but still refusing. Dr Sotelo and nurse Aneta jackson.    Jenn Cole, PT

## 2019-06-08 NOTE — ASSESSMENT & PLAN NOTE
Creatine stable for now. BMP reviewed- noted Estimated Creatinine Clearance: 48.7 mL/min (based on SCr of 1 mg/dL). according to latest data. Monitor UOP and serial BMP and adjust therapy as needed. Renally dose meds.

## 2019-06-08 NOTE — CONSULTS
"  [facility]  Adult Nutrition  Consult Note    SUMMARY     Recommendations    Recommendation: 1. Consider adding diabetic restriction to current diet. 2. If PO intake < 50 %, add boost glucose control TID. 3. Onsite RD to f/u w/ interview, NFPE & assess education needs.  Goals: Meet > 85 % EEN/ EPN while admitted  Nutrition Goal Status: new  Communication of RD Recs: (POC, sticky note)    Reason for Assessment    Reason For Assessment: consult   Dx:   1. Left-sided weakness    2. Chest pain    3. CVA (cerebral vascular accident)    Hx: CAD, DM, HTN  General info comments: Remote coverage x nutrition services today. Unable to reach patient via telephone. Pt admitted w/ c/o left sided weakness. Per ST recs: regular diet w/ thin liquids. Currently on cardiac diet. No known intake since admit. Per epic records, pt weighed 161 lbs on 11/8/18, current wt= 161 lbs ( no wt changes within the last 7 months). Unable to perform NFPE @ this time due to remove coverage.    Discharge plan: Cardiac Diabetic diet      Nutrition Risk Screen    Nutrition Risk Screen: no indicators present    Nutrition/Diet History    Spiritual, Cultural Beliefs, Orthodox Practices, Values that Affect Care: no    Anthropometrics    Temp: 98.1 °F (36.7 °C)  Height Method: Stated  Height: 5' 6"  Height (inches): 66 in  Weight Method: Standard Scale  Weight: 73.3 kg (161 lb 9.6 oz)  Weight (lb): 161.6 lb  Ideal Body Weight (IBW), Male: 142 lb  % Ideal Body Weight, Male (lb): 113.8 lb  BMI (Calculated): 26.1  BMI Grade: 25 - 29.9 - overweight       Lab/Procedures/Meds    Pertinent Labs Reviewed: reviewed  BMP  Lab Results   Component Value Date     06/08/2019    K 3.6 06/08/2019     06/08/2019    CO2 24 06/08/2019    BUN 23 06/08/2019    CREATININE 1.0 06/08/2019    CALCIUM 9.5 06/08/2019    ANIONGAP 12 06/08/2019    ESTGFRAFRICA >60 06/08/2019    EGFRNONAA >60 06/08/2019     Lab Results   Component Value Date    " CALCIUM 9.5 06/08/2019    PHOS 2.6 (L) 06/08/2019     Lab Results   Component Value Date    ALBUMIN 3.6 06/08/2019     Recent Labs   Lab 06/08/19  1048   POCTGLUCOSE 120*     No results found for: LABA1C, HGBA1C    Pertinent Medications Reviewed: reviewed    Physical Findings/Assessment     skin: Mynor score  19     Estimated/Assessed Needs    Weight Used For Calorie Calculations: 73.3 kg (161 lb 9.6 oz)  Energy Calorie Requirements (kcal): 1700  Energy Need Method: Romney-St Jeor(x1.25)  Protein Requirements: 88 g  Weight Used For Protein Calculations: 73.3 kg (161 lb 9.6 oz)     Estimated Fluid Requirement Method: RDA Method(or per MD)  RDA Method (mL): 1700  CHO Requirement: 50 % EEN      Nutrition Prescription Ordered    Nutrition Order Comments: cardiac diet    Evaluation of Received Nutrient/Fluid Intake          Intake/Output Summary (Last 24 hours) at 6/8/2019 1256  Last data filed at 6/8/2019 0438  Gross per 24 hour   Intake 240 ml   Output 975 ml   Net -735 ml       % Intake of Estimated Energy Needs: Other: JESSICA  % Meal Intake: Other: JESSICA    Nutrition Risk      2xweekly    Assessment and Plan    Nutrition Problem  Decreased nutrient needs (sodium, fat)     Related to (etiology):   Current medical condition and past medical hx     Signs and Symptoms (as evidenced by):   CVA, hx of  CAD, HTN    Interventions/Recommendations (treatment strategy):  See above     Nutrition Diagnosis Status:   New        Monitor and Evaluation    Food and Nutrient Intake: energy intake, food and beverage intake  Food and Nutrient Adminstration: diet order  Anthropometric Measurements: weight  Biochemical Data, Medical Tests and Procedures: electrolyte and renal panel, glucose/endocrine profile  Nutrition-Focused Physical Findings: overall appearance     Malnutrition Assessment               to be completed at f/u                        Nutrition Follow-Up    RD Follow-up?: Yes

## 2019-06-08 NOTE — ASSESSMENT & PLAN NOTE
Likely musculoskeletal etiology.  Troponins are negative.  Monitor on telemetry  Continue ASA and statin

## 2019-06-08 NOTE — ASSESSMENT & PLAN NOTE
Patient's FSGs are controlled on current hypoglycemics.   Last A1c reviewed- No results found for: LABA1C, HGBA1C  Most recent fingerstick glucose reviewed- No results for input(s): POCTGLUCOSE in the last 24 hours.  Current correctional scale low  Maintain anti-hyperglycemic dose as follows-   Antihyperglycemics (From admission, onward)    Start     Stop Route Frequency Ordered    06/08/19 0027  insulin aspart U-100 pen 0-5 Units      -- SubQ Before meals & nightly PRN 06/07/19 5582

## 2019-06-08 NOTE — PROGRESS NOTES
SW met with patient to complete discharge planning assessment. Patient presents as alert and oriented x 4, pleasant, good eye contact, well groomed, recall good, concentration/judgement good, average intelligence, calm, communicative, cooperative and asking and answering questions appropriately. Pt reports that he lives alone with his parrot. Pt reports that his family is scattered throughout the [place] Pt reports that his [employer] family or  (Cindy Dunlap) can assist as needed. Pt will continue to be followed for any continuity of care issues, discharge planning, and emotional support. SW remains available.       06/08/19 7545   Discharge Assessment   Assessment Type Discharge Planning Assessment   Confirmed/corrected address and phone number on facesheet? Yes   Assessment information obtained from? Patient   Prior to hospitilization cognitive status: Alert/Oriented   Prior to hospitalization functional status: Independent   Current cognitive status: Alert/Oriented   Current Functional Status: Assistive Equipment   Lives With alone   Able to Return to Prior Arrangements yes   Is patient able to care for self after discharge? Yes   Patient's perception of discharge disposition home or selfcare   Readmission Within the Last 30 Days no previous admission in last 30 days   Patient currently being followed by outpatient case management? No   Patient currently receives any other outside agency services? No   Equipment Currently Used at Home none   Do you have any problems affording any of your prescribed medications? No   Is the patient taking medications as prescribed? yes   Does the patient have transportation home? Yes   Transportation Anticipated family or friend will provide   Does the patient receive services at the [facility]? No   Discharge Plan A Home   Discharge Plan B Home;Home Health   DME Needed Upon Discharge  walker, rolling;cane, quad   Patient/Family in Agreement with Plan yes

## 2019-06-08 NOTE — CONSULTS
[facility]  Neurology  Consult Note    Patient Name: Rafael Bocanegra Jr.  MRN: [id number]  Admission Date: 6/7/2019  Hospital Length of Stay: 0 days  Code Status: Full Code   Attending Provider: Lilian Sotelo MD   Consulting Provider: Mya Blanca NP  Primary Care Physician: Rolando Roper MD  Principal Problem:CVA (cerebral vascular accident)    Inpatient consult to Neurology  Consult performed by: Mya Blanca NP  Consult ordered by: Diomedes Caro MD        Subjective:     Chief Complaint:  Left side weakness and numbness    HPI: Patient presented with L side weakness and numbness. Patient reports he has been experiencing numbness since when he fell a couple weeks ago. Yesterday he reports he noticed weakness to his L side after taking a shower and then was dragging his leg when he walked. Denies slurred speech or droopy face.     Current Neurological Medications: reviewed    Brain Imaging:  CTA head: There is mild atherosclerosis in the cavernous segments as well as in the right V4 segment of the vertebral artery.  There is, however, no critical stenosis, occlusion, thrombosis or dissection involving the vessels which comprise the anterior and posterior circulation. There is no large aneurysm or other vascular malformation.    CT head: No acute intracranial process.  Mild chronic white matter change.  Atherosclerosis.    Neck imaging:  CT neck: 1. There is extensive multilevel degenerative disc and facet disease resulting in multilevel foraminal stenosis ranging from mild to severe.  Also, there is some degree of spinal stenosis at multiple levels.  These findings are discussed in detail by level above there is no fracture or malalignment.    Cardiac Imaging:  CUS: no significant stenosis    ECHO: P    Labs:   Crt: 1  LDL: 112.4  HA1C: P    Past Medical History:   Diagnosis Date    Anemia     Anticoagulant long-term use     Arthritis     Coronary artery disease     Diabetes  mellitus     Encounter for blood transfusion     Hypertension     Kidney disease, chronic, stage III (GFR 30-59 ml/min)        Past Surgical History:   Procedure Laterality Date    carotid endartarectomy      left    COLONOSCOPY N/A 1/17/2014    [facility]    COLONOSCOPY N/A 6/29/2012    [facility]    CORONARY ARTERY BYPASS GRAFT      CORONARY STENT PLACEMENT      EGD (ESOPHAGOGASTRODUODENOSCOPY) N/A 6/29/2012    [facility]    INSERT / REPLACE / REMOVE PACEMAKER         Review of patient's allergies indicates:  No Known Allergies        No current facility-administered medications on file prior to encounter.      Current Outpatient Medications on File Prior to Encounter   Medication Sig    amLODIPine (NORVASC) 5 MG tablet amlodipine 5 mg tablet   Take 1 tablet every day by oral route as directed.    aspirin 81 MG Chew Take 1 tablet (81 mg total) by mouth once daily.    carvedilol (COREG) 6.25 MG tablet Take 6.25 mg by mouth 2 (two) times daily with meals.    ezetimibe (ZETIA) 10 mg tablet Take 10 mg by mouth once daily.    isosorbide mononitrate (IMDUR) 30 MG 24 hr tablet Take 30 mg by mouth once daily.    lisinopril-hydrochlorothiazide (PRINZIDE,ZESTORETIC) 20-12.5 mg per tablet Take 1 tablet by mouth once daily.      MULTIVITAMIN W-MINERALS/LUTEIN (CENTRUM SILVER ORAL) Take 1 tablet by mouth once daily.     nitroGLYCERIN (NITROSTAT) 0.4 MG SL tablet Place 0.4 mg under the tongue every 5 (five) minutes as needed for Chest pain.     omeprazole (PRILOSEC) 20 MG capsule Take 40 mg by mouth once daily.      potassium chloride (MICRO-K) 10 MEQ CpSR Take 20 mEq by mouth as needed (Supplement).     pravastatin (PRAVACHOL) 20 MG tablet Take 20 mg by mouth once daily.    sertraline (ZOLOFT) 100 MG tablet Take 200 mg by mouth once daily.     terazosin (HYTRIN) 5 MG capsule Take 5 mg by  mouth 2 (two) times daily.       Family History     Problem Relation (Age of Onset)    Cancer Sister        Tobacco Use    Smoking status: Former Smoker     Packs/day: 2.00     Years: 20.00     Pack years: 40.00     Last attempt to quit: 1975     Years since quittin.9    Smokeless tobacco: Never Used   Substance and Sexual Activity    Alcohol use: No    Drug use: No    Sexual activity: Not on file     Review of Systems   Constitutional: Negative.    HENT: Negative.    Eyes: Negative.    Respiratory: Negative.    Cardiovascular: Negative.    Gastrointestinal: Negative.    Endocrine: Negative.    Genitourinary: Negative.    Musculoskeletal: Positive for gait problem.   Skin: Negative.    Allergic/Immunologic: Negative.    Neurological: Positive for weakness and numbness. Negative for facial asymmetry and speech difficulty.        Left side   Hematological: Negative.    Psychiatric/Behavioral: Negative.      Objective:     Vital Signs (Most Recent):  Temp: 98.1 °F (36.7 °C) (19 07)  Pulse: 89 (19 07)  Resp: 16 (19 0739)  BP: (!) 172/72 (19 0739)  SpO2: 96 % (19 0800) Vital Signs (24h Range):  Temp:  [96.4 °F (35.8 °C)-98.1 °F (36.7 °C)] 98.1 °F (36.7 °C)  Pulse:  [76-99] 89  Resp:  [16-17] 16  SpO2:  [90 %-99 %] 96 %  BP: (161-204)/(64-96) 172/72     Weight: 73.3 kg (161 lb 9.6 oz)  Body mass index is 26.08 kg/m².    Physical Exam   Constitutional: He is oriented to person, place, and time. He appears well-developed and well-nourished.   HENT:   Head: Normocephalic and atraumatic.   Eyes: Pupils are equal, round, and reactive to light. EOM are normal.   Neck: Normal range of motion. Neck supple.   Cardiovascular: Normal rate, regular rhythm and normal heart sounds.   Pulmonary/Chest: Effort normal and breath sounds normal.   Abdominal: Soft. Bowel sounds are normal.   Musculoskeletal: Normal range of motion.   Neurological: He is alert and oriented to person, place, and  time. He has a normal Finger-Nose-Finger Test.   Skin: Skin is warm and dry.   Psychiatric: He has a normal mood and affect. His speech is normal.   Vitals reviewed.      NEUROLOGICAL EXAMINATION:     MENTAL STATUS   Oriented to person, place, and time.   Speech: speech is normal   Level of consciousness: alert    CRANIAL NERVES     CN III, IV, VI   Pupils are equal, round, and reactive to light.  Extraocular motions are normal.     CN V   Facial sensation intact.     MOTOR EXAM   Muscle bulk: normal  Overall muscle tone: normal       LUE 4/5 and unable to lift LLE  RUE and RLE strength 5/5     REFLEXES        DTR 2+     SENSORY EXAM        Numbness to L side     GAIT AND COORDINATION      Coordination   Finger to nose coordination: normal    Tremor   Resting tremor: absent  Intention tremor: absent  Action tremor: absent       Unable to observe gait           Assessment and Plan:   Clinical Stroke  L side weakness and numbness  CT head and neck neg acute  Unable to have MRI due to pacemaker  ECHO: P  PT/ST/OT eval and treat    Stroke education was provided.  If patient has acute neurological changes including weakness, confusion, speech changes, facial droop, difficulty walking, and sensory changes immediately call 911.  Follow up Neurology in 2 weeks at [phone].  Medication side effects discussed with the patient and/or caregiver.  Active Diagnoses:    Diagnosis Date Noted POA    PRINCIPAL PROBLEM:  CVA (cerebral vascular accident) [I63.9] 06/07/2019 Yes    Chest pain [R07.9] 06/07/2019 Yes    CAD--s/p CABG 2015 [I25.10] 06/07/2019 Yes    CKD (chronic kidney disease) stage 3, GFR 30-59 ml/min [N18.3] 06/07/2019 Yes    Hypertension associated with diabetes [E11.59, I10] 11/08/2018 Yes    Type 2 diabetes mellitus with stage 3 chronic kidney disease, without long-term current use of insulin [E11.22, N18.3] 11/08/2018 Yes      Problems Resolved During this Admission:       VTE Risk Mitigation (From  admission, onward)        Ordered     IP VTE LOW RISK PATIENT  Once      06/07/19 2039          Thank you for your consult. I will follow-up with patient. Please contact us if you have any additional questions.    Mya Blanca, EDGAR  Neurology  [facility]    I, Dr. Josias Montez, have personally seen and examined the patient with my advanced provider and agree with above. I personally did a focused exam, and reviewed all necessary clinical information. I discussed my management plan with my NP and agree with above. Outside the acute treatment [place]. Stroke prevention.

## 2019-06-08 NOTE — H&P
[facility] Hospital Medicine  History & Physical    Patient Name: Rafael Bocanegra Jr.  MRN: [id number]  Admission Date: 6/7/2019  Attending Physician: Carl Sandhu MD   Primary Care Provider: Rolando Roper MD         Patient information was obtained from patient and ER records.     Subjective:     Principal Problem:CVA (cerebral vascular accident)    Chief Complaint:   Chief Complaint   Patient presents with    Chest Pain     L arm numbness, L leg weakness today. Some relief of pain with NTG        HPI: Mr. Bocanegra is an 84yo M with a PMH of CAD s/p CABG, DM2, HTN, PPM, CKD3, and Chronic Anemia. He presents to the ED with c/o left sided weakness. He had a fall about 3 weeks ago onto his knees and felt a shooting pain go up his back to his neck. Since, he has been having numbness to his left arm and leg. This morning, his left arm and leg became weak. He was unable to walk due to it and his leg feels heavy. He reports that he has cervical stenosis. He also has been having chest pain for a couple weeks. It's intermittent. Today, he had left sided back pain that radiated around to his left chest area. He took 1 nitroglycerin and it was relieved. Associated symptoms include nausea. While in the ED, he had a head CT and CXR which were unremarkable. He currently denies pain or discomforts.        Past Medical History:   Diagnosis Date    Anemia     Anticoagulant long-term use     Arthritis     Coronary artery disease     Diabetes mellitus     Encounter for blood transfusion     Hypertension     Kidney disease, chronic, stage III (GFR 30-59 ml/min)        Past Surgical History:   Procedure Laterality Date    carotid endartarectomy      left    COLONOSCOPY N/A 1/17/2014    [facility]formed [facility]    COLONOSCOPY N/A 6/29/2012    [facility]    CORONARY ARTERY BYPASS GRAFT      CORONARY STENT PLACEMENT      EGD  (ESOPHAGOGASTRODUODENOSCOPY) N/A 6/29/2012    [facility]    INSERT / REPLACE / REMOVE PACEMAKER         Review of patient's allergies indicates:  No Known Allergies    No current facility-administered medications on file prior to encounter.      Current Outpatient Medications on File Prior to Encounter   Medication Sig    amLODIPine (NORVASC) 5 MG tablet amlodipine 5 mg tablet   Take 1 tablet every day by oral route as directed.    aspirin 81 MG Chew Take 1 tablet (81 mg total) by mouth once daily.    carvedilol (COREG) 6.25 MG tablet Take 6.25 mg by mouth 2 (two) times daily with meals.    ezetimibe (ZETIA) 10 mg tablet Take 10 mg by mouth once daily.    isosorbide mononitrate (IMDUR) 30 MG 24 hr tablet Take 30 mg by mouth once daily.    lisinopril-hydrochlorothiazide (PRINZIDE,ZESTORETIC) 20-12.5 mg per tablet Take 1 tablet by mouth once daily.      MULTIVITAMIN W-MINERALS/LUTEIN (CENTRUM SILVER ORAL) Take 1 tablet by mouth once daily.     nitroGLYCERIN (NITROSTAT) 0.4 MG SL tablet Place 0.4 mg under the tongue every 5 (five) minutes as needed for Chest pain.     omeprazole (PRILOSEC) 20 MG capsule Take 40 mg by mouth once daily.      potassium chloride (MICRO-K) 10 MEQ CpSR Take 20 mEq by mouth as needed (Supplement).     pravastatin (PRAVACHOL) 20 MG tablet Take 20 mg by mouth once daily.    sertraline (ZOLOFT) 100 MG tablet Take 200 mg by mouth once daily.     terazosin (HYTRIN) 5 MG capsule Take 5 mg by mouth 2 (two) times daily.     [DISCONTINUED] blood sugar diagnostic (BLOOD GLUCOSE TEST) Strp Accu-Chek Reyna Plus test strips   Take 1 strip 3 times a day by miscell. route.    [DISCONTINUED] blood-glucose meter (CONTOUR METER) kit Contour Meter kit    [DISCONTINUED] ferrous sulfate 325 mg (65 mg iron) Tab Take 325 mg by mouth daily with breakfast.      [DISCONTINUED] furosemide (LASIX) 40 MG tablet Take 40 mg by mouth 2 (two) times daily as needed for  Other or Other. For swelling, SOB, or weight gain 3 lbs overnight or 5 lbs for the week.    [DISCONTINUED] lancets (MICROLET LANCET MISC) Microlet Lancet    [DISCONTINUED] metoprolol succinate (TOPROL-XL) 50 MG 24 hr tablet Take 1 tablet (50 mg total) by mouth once daily.    [DISCONTINUED] pravastatin (PRAVACHOL) 80 MG tablet TAKE ONE TABLET BY MOUTH EVERY OTHER DAY     Family History     Problem Relation (Age of Onset)    Mother:   Father:      Cancer Sister        Tobacco Use    Smoking status: Former Smoker     Packs/day: 2.00     Years: 20.00     Pack years: 40.00     Last attempt to quit: 1975     Years since quittin.9    Smokeless tobacco: Never Used   Substance and Sexual Activity    Alcohol use: No    Drug use: No    Sexual activity: Not on file     Review of Systems   Constitutional: Negative for diaphoresis and fatigue.   HENT: Negative for congestion and postnasal drip.    Eyes: Negative for visual disturbance.   Respiratory: Positive for shortness of breath. Negative for cough.    Cardiovascular: Positive for chest pain. Negative for palpitations and leg swelling.   Gastrointestinal: Positive for nausea. Negative for abdominal pain, constipation, diarrhea and vomiting.   Genitourinary: Negative for dysuria and hematuria.   Musculoskeletal: Positive for gait problem. Negative for arthralgias.   Skin: Negative for wound.   Neurological: Positive for weakness and numbness. Negative for dizziness and headaches.   Hematological: Does not bruise/bleed easily.   Psychiatric/Behavioral: Negative for confusion and hallucinations.     Objective:     Vital Signs (Most Recent):  Temp: 96.8 °F (36 °C) (19)  Pulse: 97 (19)  Resp: 17 (19)  BP: (!) 191/64 (19)  SpO2: (!) 94 % (19) Vital Signs (24h Range):  Temp:  [96.8 °F (36 °C)-98.1 °F (36.7 °C)] 96.8 °F (36 °C)  Pulse:  [76-97] 97  Resp:  [16-17] 17  SpO2:  [94 %-99 %] 94 %  BP:  (180-204)/(64-96) 191/64     Weight: 73.3 kg (161 lb 9.6 oz)  Body mass index is 26.08 kg/m².    Physical Exam   Constitutional: He is oriented to person, place, and time. No distress.   HENT:   Head: Normocephalic.   Eyes: Pupils are equal, round, and reactive to light.   Right eye (+) lateral deviation   Neck: Normal range of motion. Neck supple. No JVD present. No tracheal deviation present.   Cardiovascular: Normal rate, regular rhythm, normal heart sounds and intact distal pulses.   Pulmonary/Chest: Effort normal and breath sounds normal. No respiratory distress.   Abdominal: Soft. Bowel sounds are normal. He exhibits no distension. There is no tenderness.   Musculoskeletal: Normal range of motion. He exhibits no edema.   Neurological: He is alert and oriented to person, place, and time. A sensory deficit is present. No cranial nerve deficit.   Mild left pronator drift. Left leg weak. (+) numbness to left hand and fingers.   Skin: Skin is warm and dry. Capillary refill takes less than 2 seconds.   Psychiatric: He has a normal mood and affect. His behavior is normal. Judgment and thought content normal.         CRANIAL NERVES     CN III, IV, VI   Pupils are equal, round, and reactive to light.       Significant Labs:   CBC:   Recent Labs   Lab 06/07/19  1704   WBC 7.20   HGB 12.7*   HCT 37.2*        CMP:   Recent Labs   Lab 06/07/19  1704      K 3.8      CO2 26   GLU 84   BUN 28*   CREATININE 1.2   CALCIUM 9.4   ANIONGAP 10   EGFRNONAA 55*     Troponin:   Recent Labs   Lab 06/07/19  1704 06/07/19  2047   TROPONINI 0.010 0.008       Significant Imaging:     CXR: Reviewed film-- looks hazy. Reviewed radiologist's report--   Impression     Left pacer, CABG, and cardiomegaly.  Mild right basilar atelectasis.     CT Head w/o Contrast: Reviewed radiologist's report-- Impression   No acute intracranial process.  Mild chronic white matter change.  Atherosclerosis.         Assessment/Plan:     * CVA  (cerebral vascular accident)  Rule out Vs Musculoskeletal etiology  Neuro checks Q4H  Continue ASA and statin  Obtain CTA brain, carotid u/s, and TTE  Unable to do MRI due to PPM  Obtain CT Cervical spine to check for nerve impingement  PT/OT/ST consults  Consult Neurology        Chest pain  Cardiac vs. Musculoskeletal etiology --hx of cervical stenosis  Troponin negative--continue to trend  Monitor on telemetry  Continue ASA and statin          CKD (chronic kidney disease) stage 3, GFR 30-59 ml/min  Stable--Creat 1.2/ GFR 55  Monitor labs      CAD--s/p CABG 2015  Continue ASA and statin      Hypertension associated with diabetes  Chronic/Uncontrolled  Allow for permissive HTN, SBP <220, until CVA is ruled out  Hold chronic medications for now    Type 2 diabetes mellitus with stage 3 chronic kidney disease, without long-term current use of insulin  Chronic/Controlled  Not on chronic medications  Monitor blood sugars QAC&HS  SSI prn   Diabetic diet        VTE Risk Mitigation (From admission, onward)        Ordered     IP VTE LOW RISK PATIENT  Once      06/07/19 2039             Almita Moreau NP  Department of Hospital Medicine   [facility]

## 2019-06-08 NOTE — SUBJECTIVE & OBJECTIVE
"Interval History:  Patient seen and examined.  Patient is very poor mood this morning and when I asked him most questions states look in my chart, I have told many other doctors.  Patient refusing to work with PT until imaging results are back and "some on can't tell me what is wrong with me".  Neurology consult pending    Review of Systems   Constitutional: Negative for chills and fever.   Respiratory: Negative for shortness of breath.    Cardiovascular: Negative for chest pain and palpitations.   Genitourinary: Negative for difficulty urinating and dysuria.   Neurological: Positive for weakness. Negative for light-headedness.   Psychiatric/Behavioral: Positive for agitation. Negative for confusion.     Objective:     Vital Signs (Most Recent):  Temp: 98.1 °F (36.7 °C) (06/08/19 0739)  Pulse: 89 (06/08/19 0739)  Resp: 16 (06/08/19 0739)  BP: (!) 172/72 (06/08/19 0739)  SpO2: 96 % (06/08/19 0800) Vital Signs (24h Range):  Temp:  [96.4 °F (35.8 °C)-98.1 °F (36.7 °C)] 98.1 °F (36.7 °C)  Pulse:  [76-99] 89  Resp:  [16-17] 16  SpO2:  [90 %-99 %] 96 %  BP: (161-204)/(64-96) 172/72     Weight: 73.3 kg (161 lb 9.6 oz)  Body mass index is 26.08 kg/m².    Intake/Output Summary (Last 24 hours) at 6/8/2019 1123  Last data filed at 6/8/2019 0438  Gross per 24 hour   Intake 240 ml   Output 975 ml   Net -735 ml      Physical Exam   Constitutional: He is oriented to person, place, and time. No distress.   HENT:   Head: Normocephalic.   Eyes: Pupils are equal, round, and reactive to light.   Right eye (+) lateral deviation   Neck: Normal range of motion. Neck supple. No JVD present. No tracheal deviation present.   Cardiovascular: Normal rate, regular rhythm, normal heart sounds and intact distal pulses.   Pulmonary/Chest: Effort normal and breath sounds normal. No respiratory distress.   Abdominal: Soft. Bowel sounds are normal. He exhibits no distension. There is no tenderness.   Musculoskeletal: Normal range of motion. He " exhibits no edema.   Neurological: He is alert and oriented to person, place, and time. A sensory deficit (Left upper and lower extremity decreased sensation) is present. No cranial nerve deficit.   Mild left pronator drift.  Left upper extremity 4/5.  Barely able to lift left lower extremity off of bed   Skin: Skin is warm and dry. Capillary refill takes less than 2 seconds.   Psychiatric:   Patient very agitated on exam       Significant Labs: All pertinent labs within the past 24 hours have been reviewed.    Significant Imaging: I have reviewed and interpreted all pertinent imaging results/findings within the past 24 hours.

## 2019-06-08 NOTE — PROGRESS NOTES
"[facility] Hospital Medicine  Progress Note    Patient Name: Rafael Bocanegra Jr.  MRN: [id number]  Patient Class: OP- Observation   Admission Date: 6/7/2019  Length of Stay: 0 days  Attending Physician: Lilian Sotelo MD  Primary Care Provider: Rolando Roper MD        Subjective:     Principal Problem:CVA (cerebral vascular accident)    HPI:  Mr. Bocanegra is an 84yo M with a PMH of CAD s/p CABG, DM2, HTN, PPM, CKD3, and Chronic Anemia. He presents to the ED with c/o left sided weakness. He had a fall about 3 weeks ago onto his knees and felt a shooting pain go up his back to his neck. Since, he has been having numbness to his left arm and leg. This morning, his left arm and leg became weak. He was unable to walk due to it and his leg feels heavy. He reports that he has cervical stenosis. He also has been having chest pain for a couple weeks. It's intermittent. Today, he had left sided back pain that radiated around to his left chest area. He took 1 nitroglycerin and it was relieved. Associated symptoms include nausea. While in the ED, he had a head CT and CXR which were unremarkable. He currently denies pain or discomforts.        Hospital Course:  No notes on file    Interval History:  Patient seen and examined.  Patient is very poor mood this morning and when I asked him most questions states look in my chart, I have told many other doctors.  Patient refusing to work with PT until imaging results are back and "some on can't tell me what is wrong with me".  Neurology consult pending    Review of Systems   Constitutional: Negative for chills and fever.   Respiratory: Negative for shortness of breath.    Cardiovascular: Negative for chest pain and palpitations.   Genitourinary: Negative for difficulty urinating and dysuria.   Neurological: Positive for weakness. Negative for light-headedness.   Psychiatric/Behavioral: Positive for agitation. Negative for confusion.     Objective:     Vital Signs " (Most Recent):  Temp: 98.1 °F (36.7 °C) (06/08/19 0739)  Pulse: 89 (06/08/19 0739)  Resp: 16 (06/08/19 0739)  BP: (!) 172/72 (06/08/19 0739)  SpO2: 96 % (06/08/19 0800) Vital Signs (24h Range):  Temp:  [96.4 °F (35.8 °C)-98.1 °F (36.7 °C)] 98.1 °F (36.7 °C)  Pulse:  [76-99] 89  Resp:  [16-17] 16  SpO2:  [90 %-99 %] 96 %  BP: (161-204)/(64-96) 172/72     Weight: 73.3 kg (161 lb 9.6 oz)  Body mass index is 26.08 kg/m².    Intake/Output Summary (Last 24 hours) at 6/8/2019 1123  Last data filed at 6/8/2019 0438  Gross per 24 hour   Intake 240 ml   Output 975 ml   Net -735 ml      Physical Exam   Constitutional: He is oriented to person, place, and time. No distress.   HENT:   Head: Normocephalic.   Eyes: Pupils are equal, round, and reactive to light.   Right eye (+) lateral deviation   Neck: Normal range of motion. Neck supple. No JVD present. No tracheal deviation present.   Cardiovascular: Normal rate, regular rhythm, normal heart sounds and intact distal pulses.   Pulmonary/Chest: Effort normal and breath sounds normal. No respiratory distress.   Abdominal: Soft. Bowel sounds are normal. He exhibits no distension. There is no tenderness.   Musculoskeletal: Normal range of motion. He exhibits no edema.   Neurological: He is alert and oriented to person, place, and time. A sensory deficit (Left upper and lower extremity decreased sensation) is present. No cranial nerve deficit.   Mild left pronator drift.  Left upper extremity 4/5.  Barely able to lift left lower extremity off of bed   Skin: Skin is warm and dry. Capillary refill takes less than 2 seconds.   Psychiatric:   Patient very agitated on exam       Significant Labs: All pertinent labs within the past 24 hours have been reviewed.    Significant Imaging: I have reviewed and interpreted all pertinent imaging results/findings within the past 24 hours.    Assessment/Plan:      * CVA (cerebral vascular accident)  Rule out Vs Musculoskeletal etiology  Neuro checks  Q4H  Continue ASA and statin  Obtain CTA brain, carotid u/s, and TTE  Unable to do MRI due to PPM  Obtain CT Cervical spine to check for nerve impingement  PT/OT/ST consults  Consult Neurology        CKD (chronic kidney disease) stage 3, GFR 30-59 ml/min  Creatine stable for now. BMP reviewed- noted Estimated Creatinine Clearance: 48.7 mL/min (based on SCr of 1 mg/dL). according to latest data. Monitor UOP and serial BMP and adjust therapy as needed. Renally dose meds.            CAD--s/p CABG 2015  Continue ASA and statin      Chest pain  Likely musculoskeletal etiology.  Troponins are negative.  Monitor on telemetry  Continue ASA and statin          Hypertension associated with diabetes  Chronic, uncontrolled.  Latest blood pressure and vitals reviewed-   Temp:  [96.4 °F (35.8 °C)-98.1 °F (36.7 °C)]   Pulse:  [76-99]   Resp:  [16-17]   BP: (161-204)/(64-96)   SpO2:  [90 %-99 %] .   Allow for permissive hypertension until stroke ruled out.        Type 2 diabetes mellitus with stage 3 chronic kidney disease, without long-term current use of insulin  Patient's FSGs are controlled on current hypoglycemics.   Last A1c reviewed- No results found for: LABA1C, HGBA1C  Most recent fingerstick glucose reviewed- No results for input(s): POCTGLUCOSE in the last 24 hours.  Current correctional scale low  Maintain anti-hyperglycemic dose as follows-   Antihyperglycemics (From admission, onward)    Start     Stop Route Frequency Ordered    06/08/19 0027  insulin aspart U-100 pen 0-5 Units      -- SubQ Before meals & nightly PRN 06/07/19 2329                VTE Risk Mitigation (From admission, onward)        Ordered     IP VTE LOW RISK PATIENT  Once      06/07/19 2039              Lilian Sotelo MD  Department of Hospital Medicine   [facility]

## 2019-06-08 NOTE — PLAN OF CARE
Problem: Adult Inpatient Plan of Care  Goal: Plan of Care Review  Outcome: Ongoing (interventions implemented as appropriate)     06/08/19 1811   Plan of Care Review   Plan of Care Reviewed With patient   Progress improving   Outcome Summary pt denies pain   NAD noted. POC reviewed w pt and they verbalized understanding. Tele- paced    Pt free from falls, Pt voids per urinal. Bed in low position, side rails up x 2. Call light in reach, bed alarm on and wheels locked. Will continue to monitor.

## 2019-06-08 NOTE — ASSESSMENT & PLAN NOTE
Chronic/Controlled  Not on chronic medications  Monitor blood sugars QAC&HS  SSI prn   Diabetic diet

## 2019-06-08 NOTE — HPI
Mr. Bocanegra is an 86yo M with a PMH of CAD s/p CABG, DM2, HTN, PPM, CKD3, and Chronic Anemia. He presents to the ED with c/o left sided weakness. He had a fall about 3 weeks ago onto his knees and felt a shooting pain go up his back to his neck. Since, he has been having numbness to his left arm and leg. This morning, his left arm and leg became weak. He was unable to walk due to it and his leg feels heavy. He reports that he has cervical stenosis. He also has been having chest pain for a couple weeks. It's intermittent. Today, he had left sided back pain that radiated around to his left chest area. He took 1 nitroglycerin and it was relieved. Associated symptoms include nausea. While in the ED, he had a head CT and CXR which were unremarkable. He currently denies pain or discomforts.

## 2019-06-08 NOTE — NURSING
"Pt refusing PT.   Dr Sotelo at bedside to speak w pt and he is still adamantly refusing PT. Pt states " lets just get this tests on the roll and than I will see if I need therapy"   "

## 2019-06-08 NOTE — ASSESSMENT & PLAN NOTE
Cardiac vs. Musculoskeletal etiology --hx of cervical stenosis  Troponin negative--continue to trend  Monitor on telemetry  Continue ASA and statin

## 2019-06-08 NOTE — PLAN OF CARE
06/07/19 5777   Patient Assessment/Suction   Level of Consciousness (AVPU) alert   PRE-TX-O2   O2 Device (Oxygen Therapy) room air   SpO2 (!) 94 %   Pulse Oximetry Type Intermittent   $ Pulse Oximetry - Single Charge Pulse Oximetry - Single   Pulse 95   Resp 16

## 2019-06-08 NOTE — ASSESSMENT & PLAN NOTE
Rule out Vs Musculoskeletal etiology  Neuro checks Q4H  Continue ASA and statin  Obtain CTA brain, carotid u/s, and TTE  Unable to do MRI due to PPM  Obtain CT Cervical spine to check for nerve impingement  PT/OT/ST consults  Consult Neurology

## 2019-06-08 NOTE — PLAN OF CARE
Problem: Adult Inpatient Plan of Care  Goal: Plan of Care Review  Outcome: Ongoing (interventions implemented as appropriate)  Recommendations     Recommendation: 1. Consider adding diabetic restriction to current diet. 2. If PO intake < 50 %, add boost glucose control TID. 3. Onsite RD to f/u w/ interview, NFPE & assess education needs.  Goals: Meet > 85 % EEN/ EPN while admitted  Nutrition Goal Status: new  Communication of RD Recs: (POC, sticky note)

## 2019-06-08 NOTE — SUBJECTIVE & OBJECTIVE
Past Medical History:   Diagnosis Date    Anemia     Anticoagulant long-term use     Arthritis     Coronary artery disease     Diabetes mellitus     Encounter for blood transfusion     Hypertension     Kidney disease, chronic, stage III (GFR 30-59 ml/min)        Past Surgical History:   Procedure Laterality Date    carotid endartarectomy      left    COLONOSCOPY N/A 1/17/2014    [facility]    COLONOSCOPY N/A 6/29/2012    [facility]    CORONARY ARTERY BYPASS GRAFT      CORONARY STENT PLACEMENT      EGD (ESOPHAGOGASTRODUODENOSCOPY) N/A 6/29/2012    [facility]    INSERT / REPLACE / REMOVE PACEMAKER         Review of patient's allergies indicates:  No Known Allergies    No current facility-administered medications on file prior to encounter.      Current Outpatient Medications on File Prior to Encounter   Medication Sig    amLODIPine (NORVASC) 5 MG tablet amlodipine 5 mg tablet   Take 1 tablet every day by oral route as directed.    aspirin 81 MG Chew Take 1 tablet (81 mg total) by mouth once daily.    carvedilol (COREG) 6.25 MG tablet Take 6.25 mg by mouth 2 (two) times daily with meals.    ezetimibe (ZETIA) 10 mg tablet Take 10 mg by mouth once daily.    isosorbide mononitrate (IMDUR) 30 MG 24 hr tablet Take 30 mg by mouth once daily.    lisinopril-hydrochlorothiazide (PRINZIDE,ZESTORETIC) 20-12.5 mg per tablet Take 1 tablet by mouth once daily.      MULTIVITAMIN W-MINERALS/LUTEIN (CENTRUM SILVER ORAL) Take 1 tablet by mouth once daily.     nitroGLYCERIN (NITROSTAT) 0.4 MG SL tablet Place 0.4 mg under the tongue every 5 (five) minutes as needed for Chest pain.     omeprazole (PRILOSEC) 20 MG capsule Take 40 mg by mouth once daily.      potassium chloride (MICRO-K) 10 MEQ CpSR Take 20 mEq by mouth as needed (Supplement).     pravastatin (PRAVACHOL) 20 MG tablet Take 20 mg by mouth  once daily.    sertraline (ZOLOFT) 100 MG tablet Take 200 mg by mouth once daily.     terazosin (HYTRIN) 5 MG capsule Take 5 mg by mouth 2 (two) times daily.     [DISCONTINUED] blood sugar diagnostic (BLOOD GLUCOSE TEST) Strp Accu-Chek Reyna Plus test strips   Take 1 strip 3 times a day by miscell. route.    [DISCONTINUED] blood-glucose meter (CONTOUR METER) kit Contour Meter kit    [DISCONTINUED] ferrous sulfate 325 mg (65 mg iron) Tab Take 325 mg by mouth daily with breakfast.      [DISCONTINUED] furosemide (LASIX) 40 MG tablet Take 40 mg by mouth 2 (two) times daily as needed for Other or Other. For swelling, SOB, or weight gain 3 lbs overnight or 5 lbs for the week.    [DISCONTINUED] lancets (MICROLET LANCET MISC) Microlet Lancet    [DISCONTINUED] metoprolol succinate (TOPROL-XL) 50 MG 24 hr tablet Take 1 tablet (50 mg total) by mouth once daily.    [DISCONTINUED] pravastatin (PRAVACHOL) 80 MG tablet TAKE ONE TABLET BY MOUTH EVERY OTHER DAY     Family History     Problem Relation (Age of Onset)    Mother:   Father:      Cancer Sister        Tobacco Use    Smoking status: Former Smoker     Packs/day: 2.00     Years: 20.00     Pack years: 40.00     Last attempt to quit: 1975     Years since quittin.9    Smokeless tobacco: Never Used   Substance and Sexual Activity    Alcohol use: No    Drug use: No    Sexual activity: Not on file     Review of Systems   Constitutional: Negative for diaphoresis and fatigue.   HENT: Negative for congestion and postnasal drip.    Eyes: Negative for visual disturbance.   Respiratory: Positive for shortness of breath. Negative for cough.    Cardiovascular: Positive for chest pain. Negative for palpitations and leg swelling.   Gastrointestinal: Positive for nausea. Negative for abdominal pain, constipation, diarrhea and vomiting.   Genitourinary: Negative for dysuria and hematuria.   Musculoskeletal: Positive for gait problem. Negative for  arthralgias.   Skin: Negative for wound.   Neurological: Positive for weakness and numbness. Negative for dizziness and headaches.   Hematological: Does not bruise/bleed easily.   Psychiatric/Behavioral: Negative for confusion and hallucinations.     Objective:     Vital Signs (Most Recent):  Temp: 96.8 °F (36 °C) (06/07/19 2110)  Pulse: 97 (06/07/19 2110)  Resp: 17 (06/07/19 2110)  BP: (!) 191/64 (06/07/19 2113)  SpO2: (!) 94 % (06/07/19 2110) Vital Signs (24h Range):  Temp:  [96.8 °F (36 °C)-98.1 °F (36.7 °C)] 96.8 °F (36 °C)  Pulse:  [76-97] 97  Resp:  [16-17] 17  SpO2:  [94 %-99 %] 94 %  BP: (180-204)/(64-96) 191/64     Weight: 73.3 kg (161 lb 9.6 oz)  Body mass index is 26.08 kg/m².    Physical Exam   Constitutional: He is oriented to person, place, and time. No distress.   HENT:   Head: Normocephalic.   Eyes: Pupils are equal, round, and reactive to light.   Right eye (+) lateral deviation   Neck: Normal range of motion. Neck supple. No JVD present. No tracheal deviation present.   Cardiovascular: Normal rate, regular rhythm, normal heart sounds and intact distal pulses.   Pulmonary/Chest: Effort normal and breath sounds normal. No respiratory distress.   Abdominal: Soft. Bowel sounds are normal. He exhibits no distension. There is no tenderness.   Musculoskeletal: Normal range of motion. He exhibits no edema.   Neurological: He is alert and oriented to person, place, and time. A sensory deficit is present. No cranial nerve deficit.   Mild left pronator drift. Left leg weak. (+) numbness to left hand and fingers.   Skin: Skin is warm and dry. Capillary refill takes less than 2 seconds.   Psychiatric: He has a normal mood and affect. His behavior is normal. Judgment and thought content normal.         CRANIAL NERVES     CN III, IV, VI   Pupils are equal, round, and reactive to light.       Significant Labs:   CBC:   Recent Labs   Lab 06/07/19  1704   WBC 7.20   HGB 12.7*   HCT 37.2*        CMP:   Recent  Labs   Lab 06/07/19  1704      K 3.8      CO2 26   GLU 84   BUN 28*   CREATININE 1.2   CALCIUM 9.4   ANIONGAP 10   EGFRNONAA 55*     Troponin:   Recent Labs   Lab 06/07/19  1704 06/07/19  2047   TROPONINI 0.010 0.008       Significant Imaging:     CXR: Reviewed film-- looks hazy. Reviewed radiologist's report--   Impression     Left pacer, CABG, and cardiomegaly.  Mild right basilar atelectasis.     CT Head w/o Contrast: Reviewed radiologist's report-- Impression   No acute intracranial process.  Mild chronic white matter change.  Atherosclerosis.

## 2019-06-08 NOTE — PT/OT/SLP EVAL
Speech Language Pathology Evaluation  Cognitive/Bedside Swallow    Patient Name:  Rafael Bocanegra Jr.   MRN:  [id number]  Admitting Diagnosis: CVA (cerebral vascular accident)    Recommendations:                  General Recommendations:  Cognitive-linguistic therapy  Diet recommendations:  Regular, Thin   Aspiration Precautions: Standard aspiration precautions   General Precautions: Standard, fall  Communication strategies:  assure he can see your face, verify understanding, ask him to speak louder    History:     Past Medical History:   Diagnosis Date    Anemia     Anticoagulant long-term use     Arthritis     Coronary artery disease     Diabetes mellitus     Encounter for blood transfusion     Hypertension     Kidney disease, chronic, stage III (GFR 30-59 ml/min)        Past Surgical History:   Procedure Laterality Date    carotid endartarectomy      left    COLONOSCOPY N/A 1/17/2014    [facility]    COLONOSCOPY N/A 6/29/2012    [facility]    CORONARY ARTERY BYPASS GRAFT      CORONARY STENT PLACEMENT      EGD (ESOPHAGOGASTRODUODENOSCOPY) N/A 6/29/2012    [facility]    INSERT / REPLACE / REMOVE PACEMAKER         Social History: Patient lives in the community.    Prior Intubation HX:  None this admission    Modified Barium Swallow: denied prior dyspahgia    Chest X-Rays: RLL atelectasis  CT Head - No acute intracranial process.  Mild chronic white matter change.  Atherosclerosis.    Prior diet: regular textures &liquids.    Occupation/hobbies/homemaking: neuropsychologist.    Subjective     I am waiting to see my treatment plan.  Patient goals: be left alone about all this stuff       Objective:     Cognitive Status:  alert, minimally cooperative, decreased attention, decreased immediate & delayed memory   Sushil Cognitive Assessment (MoCA) score - 17 out of 30 indicating moderate  cognitive-linguistic deficits     Receptive Language/Comprehension:    hearing impaired with hearing aid to left ear  Questions Simple yes/no intact  Complex yes/no mild deficit with increased complexity  Open ended questions gave minimal info  Commands  complex/abstract commands moderate deficit  Picture identification 2 in Fo 3    Pragmatics:  dismissive, argumentative  and inconsistent eye contact dismissive    Expressive Language:   Repetition Sentences mild deficit with increased complexity  Sentence formulation intact  Conversational speech mild deficit, possibly on purpose      Motor Speech: intact    Voice: WFL    Visual-Spatial: no apparent neglect or field cut,     Reading: not tested     Written Expression: barely legible, baseline per pt      Oral Musculature Evaluation  · Oral Musculature: WFL  · Dentition: (upper partial, missing lower molars, wears upper partial to eat, rarely wears lower partial)  · Secretion Management: adequate  · Mucosal Quality: adequate  · Mandibular Strength and Mobility: WNL  · Oral Labial Strength and Mobility: WNL  · Lingual Strength and Mobility: WNL  · Velar Elevation: WNL  · Buccal Strength and Mobility: WNL  · Voice Prior to PO Intake: low volume, clear    Bedside Swallow Eval:   Consistencies Assessed:  · Thin liquids via straw sip & 3 oz water challenge  · Puree via spoon  · Mixed consistencies peaches in thin juice  · Solids 1/2 cookie     Oral Phase:   · WNL    Pharyngeal Phase:   · no overt clinical signs/symptoms of aspiration  · no overt clinical signs/symptoms of pharyngeal dysphagia    Compensatory Strategies  · None    Treatment: Education re findings & need for further testing.  Pt refused further testing & offer for cognitive-linguistic tx     Assessment:     Rafael Bocanegra Jr. is a 85 y.o. male with an SLP diagnosis of Cognitive-Linguistic Impairment.  He refused further evaluation & offer for tx.    Goals:   Multidisciplinary Problems     SLP Goals     Not  on file                Plan:     · Patient to be seen:      · Plan of Care expires:     · Plan of Care reviewed with:  patient   · SLP Follow-Up:  Yes       Discharge recommendations:  Discharge Facility/Level of Care Needs: outpatient speech therapy   Barriers to Discharge:  None    Time Tracking:     SLP Treatment Date:   06/08/19  Speech Start Time:  0944  Speech Stop Time:  1014     Speech Total Time (min):  30 min    Billable Minutes: Eval 24  and Eval Swallow and Oral Function 6    Leigha Berrios CCC-SLP/A  06/08/2019

## 2019-06-09 VITALS
SYSTOLIC BLOOD PRESSURE: 176 MMHG | DIASTOLIC BLOOD PRESSURE: 79 MMHG | OXYGEN SATURATION: 95 % | RESPIRATION RATE: 16 BRPM | WEIGHT: 161.63 LBS | BODY MASS INDEX: 25.97 KG/M2 | HEART RATE: 78 BPM | TEMPERATURE: 96 F | HEIGHT: 66 IN

## 2019-06-09 LAB
ALBUMIN SERPL BCP-MCNC: 3.5 G/DL (ref 3.5–5.2)
ALP SERPL-CCNC: 56 U/L (ref 55–135)
ALT SERPL W/O P-5'-P-CCNC: 18 U/L (ref 10–44)
ANION GAP SERPL CALC-SCNC: 12 MMOL/L (ref 8–16)
AORTIC ROOT ANNULUS: 2.97 CM
AORTIC VALVE CUSP SEPERATION: 1.2 CM
AST SERPL-CCNC: 19 U/L (ref 10–40)
AV INDEX (PROSTH): 0.73
AV MEAN GRADIENT: 2.72 MMHG
AV PEAK GRADIENT: 4.08 MMHG
AV VALVE AREA: 2.22 CM2
AV VELOCITY RATIO: 0.68
BASOPHILS # BLD AUTO: 0 K/UL (ref 0–0.2)
BASOPHILS NFR BLD: 0.2 % (ref 0–1.9)
BILIRUB SERPL-MCNC: 0.5 MG/DL (ref 0.1–1)
BSA FOR ECHO PROCEDURE: 1.85 M2
BUN SERPL-MCNC: 27 MG/DL (ref 8–23)
CALCIUM SERPL-MCNC: 9 MG/DL (ref 8.7–10.5)
CHLORIDE SERPL-SCNC: 107 MMOL/L (ref 95–110)
CO2 SERPL-SCNC: 23 MMOL/L (ref 23–29)
CREAT SERPL-MCNC: 1.2 MG/DL (ref 0.5–1.4)
CV ECHO LV RWT: 0.44 CM
DIFFERENTIAL METHOD: ABNORMAL
DOP CALC AO PEAK VEL: 1.01 M/S
DOP CALC AO VTI: 19.81 CM
DOP CALC LVOT AREA: 3.05 CM2
DOP CALC LVOT DIAMETER: 1.97 CM
DOP CALC LVOT PEAK VEL: 0.69 M/S
DOP CALC LVOT STROKE VOLUME: 43.99 CM3
DOP CALCLVOT PEAK VEL VTI: 14.44 CM
E WAVE DECELERATION TIME: 246.82 MSEC
E/A RATIO: 0.51
E/E' RATIO: 12
ECHO LV POSTERIOR WALL: 1.2 CM (ref 0.6–1.1)
EOSINOPHIL # BLD AUTO: 0.2 K/UL (ref 0–0.5)
EOSINOPHIL NFR BLD: 2.4 % (ref 0–8)
ERYTHROCYTE [DISTWIDTH] IN BLOOD BY AUTOMATED COUNT: 13.1 % (ref 11.5–14.5)
EST. GFR  (AFRICAN AMERICAN): >60 ML/MIN/1.73 M^2
EST. GFR  (NON AFRICAN AMERICAN): 55 ML/MIN/1.73 M^2
FRACTIONAL SHORTENING: 23 % (ref 28–44)
GLUCOSE SERPL-MCNC: 106 MG/DL (ref 70–110)
HCT VFR BLD AUTO: 38.9 % (ref 40–54)
HGB BLD-MCNC: 13.1 G/DL (ref 14–18)
INTERVENTRICULAR SEPTUM: 1.15 CM (ref 0.6–1.1)
IVRT: 0.04 MSEC
LEFT ATRIUM SIZE: 4.12 CM
LEFT INTERNAL DIMENSION IN SYSTOLE: 4.2 CM (ref 2.1–4)
LEFT VENTRICLE DIASTOLIC VOLUME INDEX: 78.69 ML/M2
LEFT VENTRICLE DIASTOLIC VOLUME: 143.76 ML
LEFT VENTRICLE MASS INDEX: 142.3 G/M2
LEFT VENTRICLE SYSTOLIC VOLUME INDEX: 43.1 ML/M2
LEFT VENTRICLE SYSTOLIC VOLUME: 78.75 ML
LEFT VENTRICULAR INTERNAL DIMENSION IN DIASTOLE: 5.44 CM (ref 3.5–6)
LEFT VENTRICULAR MASS: 259.97 G
LV LATERAL E/E' RATIO: 10.5
LV SEPTAL E/E' RATIO: 14
LYMPHOCYTES # BLD AUTO: 1 K/UL (ref 1–4.8)
LYMPHOCYTES NFR BLD: 10.5 % (ref 18–48)
MCH RBC QN AUTO: 31.6 PG (ref 27–31)
MCHC RBC AUTO-ENTMCNC: 33.7 G/DL (ref 32–36)
MCV RBC AUTO: 94 FL (ref 82–98)
MONOCYTES # BLD AUTO: 0.5 K/UL (ref 0.3–1)
MONOCYTES NFR BLD: 5.9 % (ref 4–15)
MV PEAK A VEL: 0.83 M/S
MV PEAK E VEL: 0.42 M/S
NEUTROPHILS # BLD AUTO: 7.4 K/UL (ref 1.8–7.7)
NEUTROPHILS NFR BLD: 81 % (ref 38–73)
PISA TR MAX VEL: 1.57 M/S
PLATELET # BLD AUTO: 157 K/UL (ref 150–350)
PMV BLD AUTO: 10.5 FL (ref 9.2–12.9)
POCT GLUCOSE: 137 MG/DL (ref 70–110)
POTASSIUM SERPL-SCNC: 3.4 MMOL/L (ref 3.5–5.1)
PROT SERPL-MCNC: 6.4 G/DL (ref 6–8.4)
PV PEAK VELOCITY: 0.77 CM/S
RA PRESSURE: 3 MMHG
RBC # BLD AUTO: 4.14 M/UL (ref 4.6–6.2)
RIGHT VENTRICULAR END-DIASTOLIC DIMENSION: 3.54 CM
SODIUM SERPL-SCNC: 142 MMOL/L (ref 136–145)
TDI LATERAL: 0.04
TDI SEPTAL: 0.03
TDI: 0.04
TR MAX PG: 9.86 MMHG
TRICUSPID ANNULAR PLANE SYSTOLIC EXCURSION: 1.07 CM
TV REST PULMONARY ARTERY PRESSURE: 13 MMHG
WBC # BLD AUTO: 9.2 K/UL (ref 3.9–12.7)

## 2019-06-09 PROCEDURE — G0378 HOSPITAL OBSERVATION PER HR: HCPCS

## 2019-06-09 PROCEDURE — 25000003 PHARM REV CODE 250: Performed by: NURSE PRACTITIONER

## 2019-06-09 PROCEDURE — 97116 GAIT TRAINING THERAPY: CPT

## 2019-06-09 PROCEDURE — 94761 N-INVAS EAR/PLS OXIMETRY MLT: CPT

## 2019-06-09 PROCEDURE — 80053 COMPREHEN METABOLIC PANEL: CPT

## 2019-06-09 PROCEDURE — G8978 MOBILITY CURRENT STATUS: HCPCS | Mod: CJ

## 2019-06-09 PROCEDURE — 85025 COMPLETE CBC W/AUTO DIFF WBC: CPT

## 2019-06-09 PROCEDURE — 25000003 PHARM REV CODE 250: Performed by: HOSPITALIST

## 2019-06-09 PROCEDURE — 25000003 PHARM REV CODE 250: Performed by: EMERGENCY MEDICINE

## 2019-06-09 PROCEDURE — 36415 COLL VENOUS BLD VENIPUNCTURE: CPT

## 2019-06-09 PROCEDURE — G8979 MOBILITY GOAL STATUS: HCPCS | Mod: CI

## 2019-06-09 PROCEDURE — 97161 PT EVAL LOW COMPLEX 20 MIN: CPT

## 2019-06-09 RX ORDER — CLOPIDOGREL BISULFATE 75 MG/1
75 TABLET ORAL DAILY
Status: DISCONTINUED | OUTPATIENT
Start: 2019-06-10 | End: 2019-06-09

## 2019-06-09 RX ORDER — CLOPIDOGREL BISULFATE 75 MG/1
75 TABLET ORAL DAILY
Qty: 30 TABLET | Refills: 0 | Status: SHIPPED | OUTPATIENT
Start: 2019-06-10 | End: 2021-02-16 | Stop reason: SDUPTHER

## 2019-06-09 RX ORDER — CLOPIDOGREL BISULFATE 75 MG/1
75 TABLET ORAL DAILY
Status: DISCONTINUED | OUTPATIENT
Start: 2019-06-09 | End: 2019-06-09 | Stop reason: HOSPADM

## 2019-06-09 RX ORDER — DOCUSATE SODIUM 100 MG/1
100 CAPSULE, LIQUID FILLED ORAL 2 TIMES DAILY
Status: DISCONTINUED | OUTPATIENT
Start: 2019-06-09 | End: 2019-06-09 | Stop reason: HOSPADM

## 2019-06-09 RX ADMIN — ISOSORBIDE MONONITRATE 30 MG: 30 TABLET, EXTENDED RELEASE ORAL at 08:06

## 2019-06-09 RX ADMIN — MULTIPLE VITAMINS W/ MINERALS TAB 1 TABLET: TAB at 08:06

## 2019-06-09 RX ADMIN — ASPIRIN 81 MG CHEWABLE TABLET 81 MG: 81 TABLET CHEWABLE at 08:06

## 2019-06-09 RX ADMIN — CARVEDILOL 6.25 MG: 6.25 TABLET, FILM COATED ORAL at 08:06

## 2019-06-09 RX ADMIN — CLOPIDOGREL BISULFATE 75 MG: 75 TABLET ORAL at 02:06

## 2019-06-09 RX ADMIN — PRAVASTATIN SODIUM 20 MG: 10 TABLET ORAL at 08:06

## 2019-06-09 RX ADMIN — DOCUSATE SODIUM 100 MG: 100 CAPSULE, LIQUID FILLED ORAL at 11:06

## 2019-06-09 RX ADMIN — SERTRALINE HYDROCHLORIDE 200 MG: 50 TABLET ORAL at 08:06

## 2019-06-09 RX ADMIN — PANTOPRAZOLE SODIUM 40 MG: 40 TABLET, DELAYED RELEASE ORAL at 08:06

## 2019-06-09 NOTE — DISCHARGE SUMMARY
[facility]  Hospital Medicine  Discharge Summary      Patient Name: Rafael Bocanegra Jr.  MRN: [id number]  Admission Date: 6/7/2019  Hospital Length of Stay: 0 days  Discharge Date and Time: No discharge date for patient encounter.  Attending Physician: Lilian Sotelo MD   Discharging Provider: Lilian Sotelo MD  Primary Care Provider: Rolando Roper MD      HPI:   Mr. Bocanegra is an 84yo M with a PMH of CAD s/p CABG, DM2, HTN, PPM, CKD3, and Chronic Anemia. He presents to the ED with c/o left sided weakness. He had a fall about 3 weeks ago onto his knees and felt a shooting pain go up his back to his neck. Since, he has been having numbness to his left arm and leg. This morning, his left arm and leg became weak. He was unable to walk due to it and his leg feels heavy. He reports that he has cervical stenosis. He also has been having chest pain for a couple weeks. It's intermittent. Today, he had left sided back pain that radiated around to his left chest area. He took 1 nitroglycerin and it was relieved. Associated symptoms include nausea. While in the ED, he had a head CT and CXR which were unremarkable. He currently denies pain or discomforts.        * No surgery found *      Hospital Course:   Patient was admitted to the hospital medicine service for left upper and lower extremity weakness.  CT head negative for infarction or bleed.  Carotid ultrasound within normal limits.  Echo with EF of 55%, mild diastolic dysfunction, and negative bubble study.  CT spine with degenerative changes.  Patient unable to have MRI to confirm stroke due to pacemaker.  He was diagnosed with clinical stroke per neurologist.  He was already on aspirin and statin prior to admission so Plavix was added to his regimen.  PT/OT/ST was consulted.  Patient refused PT evaluation multiple times during his stay and eventually worked with them.  Neurology recommended inpatient rehab consultation but patient stated he had to be  discharged today and with sign AMA paperwork if he had too.  PT had recommended outpatient PT so I elected to discharge him with outpatient PT orders and with a rolling walker per their recommendations.  Patient reports he has an  that can assist with him at home and he will have paid caregivers come to his home starting tomorrow.  I will have him follow up with his PCP in a week and with Neurology in 2-3 weeks.  Patient agreeable to discharge plan. Again, he refused to stay for inpatient rehab evaluation and requested discharge today.    Discharge exam  Awake, alert, no apparent distress  Regular rate and rhythm, no murmur  Lungs clear to auscultation bilaterally  5/5 strength left upper extremity, 4/5 strength left lower extremity.       Consults:   Consults (From admission, onward)        Status Ordering Provider     Inpatient consult to Neurology  Once     Provider:  Denys Montez MD    Completed EDWARDO SEALS     Inpatient consult to Physical Medicine Rehab  Once     Provider:  Morales Devi MD    Acknowledged DENYS VILCHIS     Inpatient consult to Registered Dietitian/Nutritionist  Once     Provider:  (Not yet assigned)    Completed NATIVIDAD SWARTZ     IP consult to case management/social work  Once     Provider:  (Not yet assigned)    Completed NATIVIDAD SWARTZ          No new Assessment & Plan notes have been filed under this hospital service since the last note was generated.  Service: Hospital Medicine    Final Active Diagnoses:    Diagnosis Date Noted POA    PRINCIPAL PROBLEM:  CVA (cerebral vascular accident) [I63.9] 06/07/2019 Yes    Chest pain [R07.9] 06/07/2019 Yes    CAD--s/p CABG 2015 [I25.10] 06/07/2019 Yes    CKD (chronic kidney disease) stage 3, GFR 30-59 ml/min [N18.3] 06/07/2019 Yes    Hypertension associated with diabetes [E11.59, I10] 11/08/2018 Yes    Type 2 diabetes mellitus with stage 3 chronic kidney disease, without long-term current use of  "insulin [E11.22, N18.3] 11/08/2018 Yes      Problems Resolved During this Admission:       Discharged Condition: fair    Disposition: Home or Self Care    Follow Up:  Follow-up Information     Rolando Roper MD In 1 week.    Specialty:  Family Medicine  Contact information:  [address] [place] [address]  [phone]             Eber Montez MD.    Specialties:  Vascular Neurology, Neurology  Why:  Call number on business card given to you to make appointment in 2-3 weeks  Contact information:  [address] [facility]  NEURO CARE [facility] [address]  [phone]             [facility]    Specialty:  DME Provider  Why:  DME-rolling walker  Contact information:  [address] [facility] [address]  [phone]                 Patient Instructions:      WALKER FOR HOME USE     Order Specific Question Answer Comments   Type of Walker: Adult (5'4"-6'6")    With wheels? Yes    Height: 5' 6" (1.676 m)    Weight: 73.3 kg (161 lb 9.6 oz)    Length of need (1-99 months): 12    Does patient have medical equipment at home? none    Please check all that apply: Patient's condition impairs ambulation.      Referral to OutPatient  Physical therapy   Referral Priority: Routine Referral Type: Physical Medicine   Referral Reason: Specialty Services Required   Requested Specialty: Physical Therapy   Number of Visits Requested: 1     Notify your health care provider if you experience any of the following:  increased confusion or weakness     Notify your health care provider if you experience any of the following:  persistent dizziness, light-headedness, or visual disturbances     Activity as tolerated       Significant Diagnostic Studies: Labs:   BMP:   Recent Labs   Lab 06/07/19  1704 06/08/19  0245 06/09/19  0353   GLU 84 88 106    144 142   K 3.8 3.6 3.4*    108 107   CO2 26 24 23   BUN 28* 23 27*   CREATININE 1.2 1.0 1.2   CALCIUM 9.4 9.5 9.0   MG  --  1.9  --    , CMP   Recent Labs "   Lab 06/07/19  1704 06/08/19  0245 06/09/19  0353    144 142   K 3.8 3.6 3.4*    108 107   CO2 26 24 23   GLU 84 88 106   BUN 28* 23 27*   CREATININE 1.2 1.0 1.2   CALCIUM 9.4 9.5 9.0   PROT  --  6.7 6.4   ALBUMIN  --  3.6 3.5   BILITOT  --  0.5 0.5   ALKPHOS  --  59 56   AST  --  22 19   ALT  --  22 18   ANIONGAP 10 12 12   ESTGFRAFRICA >60 >60 >60   EGFRNONAA 55* >60 55*    and CBC   Recent Labs   Lab 06/07/19  1704 06/08/19  0245 06/09/19  0353   WBC 7.20 9.30 9.20   HGB 12.7* 13.8* 13.1*   HCT 37.2* 41.5 38.9*    159 157     Radiology Results (last 7 days)     Procedure Component Value Units Date/Time   CTA Head [[id number]] Resulted: 06/08/19 1456   Order Status: Completed Updated: 06/08/19 1459   Narrative:     EXAMINATION:  CTA HEAD    CLINICAL HISTORY:  Stroke;    TECHNIQUE:  CT angiogram was performed from the level of the bottom of C2 to the top of the head following the IV administration of 75 mL of Omnipaque 350.   Sagittal and coronal reconstructions and maximum intensity projection reconstructions were performed.    COMPARISON:  Head CT dated 06/07/2019    FINDINGS:  CTA head    Anterior circulation: The petrous and cavernous segments of the internal carotid arteries are patent.  There is mild atherosclerosis in the cavernous segments but there is no critical stenosis.  The supraclinoid internal carotid arteries are patent.  The carotid terminus is normal bilaterally.  There is normal branching into the anterior and middle cerebral arteries.  There is no critical stenosis, occlusion, thrombosis or dissection.  There is no large aneurysm or other vascular malformation.    Posterior circulation: There is mild atherosclerosis in the V4 segment of the right vertebral artery but this does not result in stenosis.  Both vertebral arteries are patent and supply a normal basilar artery.  The basilar tip is normal.  There is normal branching into the superior cerebellar and posterior cerebral  arteries.  The proximal right posterior cerebral artery is duplicated.  There is no critical stenosis, occlusion, thrombosis, dissection, aneurysm or other vascular malformation.    Other: The dural sinuses are patent.   Impression:       There is mild atherosclerosis in the cavernous segments as well as in the right V4 segment of the vertebral artery.  There is, however, no critical stenosis, occlusion, thrombosis or dissection involving the vessels which comprise the anterior and posterior circulation.  There is no large aneurysm or other vascular malformation.      Electronically signed by: Severo Earl MD  Date: 06/08/2019  Time: 14:56   CT Cervical Spine Without Contrast [[id number]] Resulted: 06/08/19 1328   Order Status: Completed Updated: 06/08/19 1331   Narrative:     EXAMINATION:  CT CERVICAL SPINE WITHOUT CONTRAST    CLINICAL HISTORY:  left arm/leg numbness and weakness;    TECHNIQUE:  Low dose axial images, sagittal and coronal reformations were preformed though the cervical spine.  Contrast was not administered.    COMPARISON:  None    FINDINGS:  Vertebral column: There is extensive degenerative change throughout the entire cervical spine with marked disc space narrowing from the C2-3 through C6-7 levels with mild-to-moderate disc space narrowing at the C7-T1 level.  There is no fracture or malalignment.  The odontoid process is intact.  The facet joints maintain normal articulation.  There is multilevel facet joint arthropathy.  There is endplate osteophyte formation at every level.    Spinal canal, cord, epidural space: The spinal canal is developmentally normal but degenerative change does contribute to spinal stenosis at several levels.  There is no abnormal epidural mass or fluid collection.    Findings by level:    C1-2: Alignment is normal.  There is mild joint space narrowing on the right.    C2-3: There is marked disc space narrowing.  There is bilateral uncovertebral spurring and left  greater than right facet joint arthropathy.  There is a disc osteophyte complex.  There is borderline to mild spinal stenosis with severe left foraminal stenosis.  There is mild right foraminal stenosis.    C3-4: There is marked disc space narrowing.  Bilateral uncovertebral spurring is present in addition a left greater than right facet joint arthropathy with a moderate broad disc osteophyte complex.  There is moderate to marked spinal stenosis and marked bilateral foraminal stenosis.    C4-5: There is marked disc space narrowing.  There is bilateral uncovertebral spurring with left greater than right facet joint arthropathy.  There is a broad left paracentral disc protrusion/osteophyte contributing to moderate spinal stenosis with severe left and moderate to severe right foraminal stenosis.    C5-6: There is marked disc space narrowing.  There is marked bilateral uncovertebral spurring but right greater than left facet joint arthropathy.  There is a broad disc protrusion/osteophyte.  There is mild-to-moderate spinal stenosis with severe right and moderate to severe left foraminal stenosis.    C6-7: There is marked disc space narrowing.  There is bilateral uncovertebral spurring with a broad disc osteophyte complex.  There is no spinal stenosis.  There is mild-to-moderate bilateral foraminal stenosis.    C7-T1: There is bilateral facet joint arthropathy but there is no spinal canal or significant foraminal stenosis.    Soft tissues, other: The prevertebral soft tissues are normal.  There is moderate to marked atherosclerosis.  The airway is patent.  There are chronic changes in the lung apices but there is no pneumothorax.   Impression:       1. There is extensive multilevel degenerative disc and facet disease resulting in multilevel foraminal stenosis ranging from mild to severe.  Also, there is some degree of spinal stenosis at multiple levels.  These findings are discussed in detail by level above there is no  fracture or malalignment.      Electronically signed by: Severo Earl MD  Date: 06/08/2019  Time: 13:28   US Carotid Bilateral [[id number]] Resulted: 06/08/19 1136   Order Status: Completed Updated: 06/08/19 1138   Narrative:     EXAMINATION:  US CAROTID BILATERAL    CLINICAL HISTORY:  cva;    TECHNIQUE:  Grayscale and color Doppler ultrasound examination of the carotid and vertebral artery systems bilaterally.  Stenosis estimates are per the NASCET measurement criteria.    COMPARISON:  None.    FINDINGS:  Right:    Internal Carotid Artery (ICA) peak systolic velocity 61 cm/sec    ICA/CCA peak systolic velocity ratio: 1.3    Plaque formation: Mild    Vertebral artery: Antegrade flow and normal waveform.    Left:    Internal Carotid Artery (ICA)  peak systolic velocity 93 cm/sec    ICA/CCA peak systolic velocity ratio: 1.5    Plaque formation: Mild    Vertebral artery: Antegrade flow and normal waveform.   Impression:       No evidence of a hemodynamically significant carotid bifurcation stenosis.      Electronically signed by: Cortney Jonas MD  Date: 06/08/2019  Time: 11:36   CT Head Without Contrast [[id number]] Resulted: 06/07/19 1737   Order Status: Completed Updated: 06/07/19 1740   Narrative:     EXAMINATION:  CT HEAD WITHOUT CONTRAST    CLINICAL HISTORY:  Focal neuro deficit, new, fixed or worsening, >6 hours;    TECHNIQUE:  Low dose axial images were obtained through the head.  Coronal and sagittal reformations were also performed. Contrast was not administered.    COMPARISON:  None.    FINDINGS:  Gray-white differentiation is well maintained.  There is no intracranial hemorrhage.  There is no mass or midline shift.  The ventricles are nondilated.  There is mild patchy white matter hypoattenuation which is typically chronic.  Mild generalized cerebral volume loss is present.  There is heavy calcification of the intracranial ICAs.  The calvarium is intact.   Impression:       No acute intracranial process.   Mild chronic white matter change.  Atherosclerosis.      Electronically signed by: Devon Pritchard MD  Date: 06/07/2019  Time: 17:37   X-Ray Chest 1 View [[id number]] Resulted: 06/07/19 1703   Order Status: Completed Updated: 06/07/19 1706   Narrative:     EXAMINATION:  XR CHEST 1 VIEW    CLINICAL HISTORY:  CP;    TECHNIQUE:  Single frontal view of the chest was performed.    COMPARISON:  None    FINDINGS:  A left-sided triple lead cardiac pacer is present.  There are postoperative changes of CABG.  The heart is enlarged.  There is mild right basilar atelectasis.  No consolidation or pleural effusion.   Impression:       Left pacer, CABG, and cardiomegaly.  Mild right basilar atelectasis.      Electronically signed by: Devon Pritchard MD  Date: 06/07/2019  Time: 17:03     TRANSTHORACIC ECHO (TTE) COMPLETE  Conclusion     · Concentric left ventricular hypertrophy.  · Left ventricular systolic function. The estimated ejection fraction is 55%  · Grade I (mild) left ventricular diastolic dysfunction consistent with impaired relaxation.  · Normal left atrial pressure.  · Mild left atrial enlargement.  · Mild aortic regurgitation.  · Normal bubble study    Pending Diagnostic Studies:     None         Medications:  Reconciled Home Medications:      Medication List      START taking these medications    clopidogrel 75 mg tablet  Commonly known as:  PLAVIX  Take 1 tablet (75 mg total) by mouth once daily.  Start taking on:  6/10/2019        CONTINUE taking these medications    amLODIPine 5 MG tablet  Commonly known as:  NORVASC  amlodipine 5 mg tablet   Take 1 tablet every day by oral route as directed.     aspirin 81 MG Chew  Take 1 tablet (81 mg total) by mouth once daily.     carvedilol 6.25 MG tablet  Commonly known as:  COREG  Take 6.25 mg by mouth 2 (two) times daily with meals.     CENTRUM SILVER ORAL  Take 1 tablet by mouth once daily.     ezetimibe 10 mg tablet  Commonly known as:  ZETIA  Take 10 mg by mouth  once daily.     isosorbide mononitrate 30 MG 24 hr tablet  Commonly known as:  IMDUR  Take 30 mg by mouth once daily.     lisinopril-hydrochlorothiazide 20-12.5 mg per tablet  Commonly known as:  PRINZIDE,ZESTORETIC  Take 1 tablet by mouth once daily.     nitroGLYCERIN 0.4 MG SL tablet  Commonly known as:  NITROSTAT  Place 0.4 mg under the tongue every 5 (five) minutes as needed for Chest pain.     omeprazole 20 MG capsule  Commonly known as:  PRILOSEC  Take 40 mg by mouth once daily.     potassium chloride 10 MEQ Cpsr  Commonly known as:  MICRO-K  Take 20 mEq by mouth as needed (Supplement).     pravastatin 20 MG tablet  Commonly known as:  PRAVACHOL  Take 20 mg by mouth once daily.     sertraline 100 MG tablet  Commonly known as:  ZOLOFT  Take 200 mg by mouth once daily.     terazosin 5 MG capsule  Commonly known as:  HYTRIN  Take 5 mg by mouth 2 (two) times daily.            Indwelling Lines/Drains at time of discharge:   Lines/Drains/Airways          None          Time spent on the discharge of patient: 35 minutes  Patient was seen and examined on the date of discharge and determined to be suitable for discharge.     Case discussed with Neurology on day of discharge.    Lilian Sotleo MD  Department of Hospital Medicine  [facility]

## 2019-06-09 NOTE — PLAN OF CARE
06/08/19 2007   Patient Assessment/Suction   Level of Consciousness (AVPU) alert   PRE-TX-O2   O2 Device (Oxygen Therapy) room air   SpO2 96 %   Pulse Oximetry Type Intermittent   Pulse 79   Resp 18

## 2019-06-09 NOTE — PROGRESS NOTES
[facility]  Neurology  Progress Note    Patient Name: Rafael Bocanegra Jr.  MRN: [id number]  Admission Date: 6/7/2019  Hospital Length of Stay: 0 days  Code Status: Full Code   Attending Provider: Lilian Sotelo MD  Primary Care Physician: Rolando Roper MD   Principal Problem:CVA (cerebral vascular accident)    Subjective:     Interval History: Patient seen and examined, reports he is doing well. Was on ASA prior to clinical stroke, will start plavix 75mg daily. Recommend IPR consult for weakness since he lives alone    HPI: Patient presented with L side weakness and numbness. Patient reports he has been experiencing numbness since when he fell a couple weeks ago. Yesterday he reports he noticed weakness to his L side after taking a shower and then was dragging his leg when he walked. Denies slurred speech or droopy face.      Current Neurological Medications: reviewed     Brain Imaging:  CTA head: There is mild atherosclerosis in the cavernous segments as well as in the right V4 segment of the vertebral artery.  There is, however, no critical stenosis, occlusion, thrombosis or dissection involving the vessels which comprise the anterior and posterior circulation. There is no large aneurysm or other vascular malformation.     CT head: No acute intracranial process.  Mild chronic white matter change.  Atherosclerosis.     Neck imaging:  CT neck: 1. There is extensive multilevel degenerative disc and facet disease resulting in multilevel foraminal stenosis ranging from mild to severe.  Also, there is some degree of spinal stenosis at multiple levels.  These findings are discussed in detail by level above there is no fracture or malalignment.     Cardiac Imaging:  CUS: no significant stenosis     ECHO: P     Labs:   Crt: 1  LDL: 112.4  HA1C: 6.1    Current Facility-Administered Medications   Medication Dose Route Frequency Provider Last Rate Last Dose    aspirin chewable tablet 81 mg  81 mg Oral  Daily Diomedes Caro MD   81 mg at 06/09/19 0808    carvedilol tablet 6.25 mg  6.25 mg Oral BID WM Diomedes Caro MD   6.25 mg at 06/09/19 0808    dextrose 50% injection 12.5 g  12.5 g Intravenous PRN Almita Moreau NP        dextrose 50% injection 25 g  25 g Intravenous PRN Almita Moreau NP        docusate sodium capsule 100 mg  100 mg Oral BID Lilian Sotelo MD        glucagon (human recombinant) injection 1 mg  1 mg Intramuscular PRN Almita Moreau NP        glucose chewable tablet 16 g  16 g Oral PRN Almita Moreau NP        glucose chewable tablet 24 g  24 g Oral PRN Almita Moreau NP        insulin aspart U-100 pen 0-5 Units  0-5 Units Subcutaneous QID (AC + HS) PRN Almita Moreau NP        isosorbide mononitrate 24 hr tablet 30 mg  30 mg Oral Daily Diomedes Caro MD   30 mg at 06/09/19 0808    multivit-iron-FA-calcium-mins 9 mg iron-400 mcg tablet Tab 1 tablet  1 tablet Oral Daily Almita Moreau NP   1 tablet at 06/09/19 0808    pantoprazole EC tablet 40 mg  40 mg Oral Daily Almita Moreau, NP   40 mg at 06/09/19 0808    pravastatin tablet 20 mg  20 mg Oral Daily Almita Moreau, NP   20 mg at 06/09/19 0808    sertraline tablet 200 mg  200 mg Oral Daily Almita Moreau NP   200 mg at 06/09/19 0808    sodium chloride 0.9% flush 10 mL  10 mL Intravenous PRN Diomedes Caro MD           Review of Systems   Constitutional: Negative.    HENT: Negative.    Eyes: Negative.    Respiratory: Negative.    Cardiovascular: Negative.    Gastrointestinal: Negative.    Endocrine: Negative.    Genitourinary: Negative.    Musculoskeletal: Negative.    Skin: Negative.    Allergic/Immunologic: Negative.    Neurological: Positive for weakness.        L side   Hematological: Negative.    Psychiatric/Behavioral: Negative.      Objective:     Vital Signs (Most Recent):  Temp: 96.4 °F (35.8 °C) (06/09/19 0715)  Pulse: 78 (06/09/19 0715)  Resp: 16 (06/09/19 0715)  BP: (!)  176/79 (06/09/19 0715)  SpO2: (!) 93 % (06/09/19 0800) Vital Signs (24h Range):  Temp:  [96.4 °F (35.8 °C)-98.6 °F (37 °C)] 96.4 °F (35.8 °C)  Pulse:  [70-88] 78  Resp:  [16-18] 16  SpO2:  [92 %-96 %] 93 %  BP: (109-179)/(59-81) 176/79     Weight: 73.3 kg (161 lb 9.6 oz)  Body mass index is 26.08 kg/m².    Physical Exam   Constitutional: He is oriented to person, place, and time. He appears well-developed and well-nourished.   HENT:   Head: Normocephalic and atraumatic.   Eyes: Pupils are equal, round, and reactive to light. EOM are normal.   Neck: Normal range of motion. Neck supple.   Cardiovascular: Normal rate, regular rhythm and normal heart sounds.   Pulmonary/Chest: Effort normal and breath sounds normal.   Abdominal: Soft.   Musculoskeletal: Normal range of motion.   Neurological: He is alert and oriented to person, place, and time. He has a normal Finger-Nose-Finger Test.   Skin: Skin is warm and dry.   Psychiatric: He has a normal mood and affect. His speech is normal.   Vitals reviewed.      NEUROLOGICAL EXAMINATION:     MENTAL STATUS   Oriented to person, place, and time.   Speech: speech is normal   Level of consciousness: alert    CRANIAL NERVES     CN III, IV, VI   Pupils are equal, round, and reactive to light.  Extraocular motions are normal.     CN V   Facial sensation intact.     CN VII   Facial expression full, symmetric.     MOTOR EXAM        Strength LUE and LLE 4/5  Able to lift L leg today  RUE and RLE 5/5     SENSORY EXAM        Patient reports decreased sensation to L side compared to R     GAIT AND COORDINATION      Coordination   Finger to nose coordination: normal       Did not observe gait           Assessment and Plan:   Clinical Stroke  L side weakness and numbness (arm and leg - acute onset)  CT head and neck neg acute - no petechia or hemorrhage noted  Unable to have MRI due to pacemaker  ECHO: P  PT/ST/OT eval and treat  Recommend IPR consult  Plavix 75mg start tomorrow  Stop  ASA     Stroke education was provided.  If patient has acute neurological changes including weakness, confusion, speech changes, facial droop, difficulty walking, and sensory changes immediately call 911.  Follow up Neurology in 2 weeks at [phone].  Medication side effects discussed with the patient and/or caregiver.  Active Diagnoses:    Diagnosis Date Noted POA    PRINCIPAL PROBLEM:  CVA (cerebral vascular accident) [I63.9] 06/07/2019 Yes    Chest pain [R07.9] 06/07/2019 Yes    CAD--s/p CABG 2015 [I25.10] 06/07/2019 Yes    CKD (chronic kidney disease) stage 3, GFR 30-59 ml/min [N18.3] 06/07/2019 Yes    Hypertension associated with diabetes [E11.59, I10] 11/08/2018 Yes    Type 2 diabetes mellitus with stage 3 chronic kidney disease, without long-term current use of insulin [E11.22, N18.3] 11/08/2018 Yes      Problems Resolved During this Admission:       VTE Risk Mitigation (From admission, onward)        Ordered     IP VTE LOW RISK PATIENT  Once      06/07/19 2039          Mya Blanca, EDGAR  Neurology  [facility]    I, Dr. Josias Montez, have personally seen and examined the patient with my advanced provider and agree with above. I personally did a focused exam, and reviewed all necessary clinical information. I discussed my management plan with my NP and agree with above. Stroke education provided. Continue stroke prevention.

## 2019-06-09 NOTE — DISCHARGE INSTRUCTIONS
Thank you for choosing [facility] for your medical care. The primary doctor who is taking care of you at the time of your discharge is Lilian Sotelo MD.     You were admitted to the hospital with CVA (cerebral vascular accident).     Please note your discharge instructions, including diet/activity restrictions, follow-up appointments, and medication changes.  If you have any questions about your medical issues, prescriptions, or any other questions, please feel free to contact the [facility] Medicine Dept at [phone] and we will help.    If you are previously with Home health, outpatient PT/OT or under a therapy program, you are cleared to return to those programs.    Please direct all long term medication refills and follow up to your primary care provider, Rolando Roper MD. Thank you again for letting us take care of your health care needs.

## 2019-06-09 NOTE — PLAN OF CARE
Problem: Physical Therapy Goal  Goal: Physical Therapy Goal  Goals to be met by: 19     Patient will increase functional independence with mobility by performin. Supine to sit with Modified [place]  2. Sit to stand transfer with Modified [place]  3. Bed to chair transfer with Modified [place] using no AD.  4. Gait  x >250 feet with Modified [place] using Rolling Walker.     Patient seen for initial evaluation and treatment to establish goals.

## 2019-06-09 NOTE — PT/OT/SLP EVAL
Physical Therapy Evaluation    Patient Name:  Rafael Bocanegra Jr.   MRN:  [id number]    Recommendations:     Discharge Recommendations:  home, other (see comments), outpatient PT(Patient needs support at home.)   Discharge Equipment Recommendations: walker, rolling   Barriers to discharge: Decreased caregiver support    Assessment:     Rafael Bocanegra Jr. is a 85 y.o. male admitted with a medical diagnosis of CVA (cerebral vascular accident).  He presents with the following impairments/functional limitations:  weakness, impaired balance, gait instability ..    Rehab Prognosis: Good; patient would benefit from acute skilled PT services to address these deficits and reach maximum level of function.    Recent Surgery: * No surgery found *      Plan:     During this hospitalization, patient to be seen daily to address the identified rehab impairments via gait training, therapeutic activities, therapeutic exercises, neuromuscular re-education and progress toward the following goals:    · Plan of Care Expires:  07/09/19    Subjective     Chief Complaint: weakness to R side  Patient/Family Comments/goals: to return home.  Pain/Comfort:  · Pain Rating 1: 0/10    Patients cultural, spiritual, Jehovah's witness conflicts given the current situation: no    Living Environment:  Lives alone. Has steps on back entrance.  Prior to admission, patients level of function was Independent without AD.  Equipment used at home: none.  DME owned (not currently used): none.  Upon discharge, patient will have assistance from hired staff..    Objective:     Communicated with nurse  prior to session.  Patient found supine with    upon PT entry to room.    General Precautions: Standard, fall   Orthopedic Precautions:N/A   Braces: N/A     Exams:  · Cognitive Exam:  Patient is oriented to Person, Place, Time and Situation  · Gross Motor Coordination:  slow  · RUE ROM: WFL  · RUE Strength: 3+/5  · LUE ROM: WFL  · LUE Strength: 3/5  · RLE ROM: WFL  · RLE  Strength: 3+/5  · LLE ROM: WFL  · LLE Strength: 3/5 gross    Functional Mobility:  · Bed Mobility:     · Rolling Left:  supervision  · Transfers:     · Sit to Stand:  supervision with rolling walker  · Gait: >150 ft with RW  CGA  · Balance: Fair standing static      Therapeutic Activities and Exercises:   Instructed on leg lifts and AROM against gravity while in bed.    AM-PAC 6 CLICK MOBILITY  Total Score:22     Patient left supine with nurse present.    GOALS:   Multidisciplinary Problems     Physical Therapy Goals        Problem: Physical Therapy Goal    Goal Priority Disciplines Outcome Goal Variances Interventions   Physical Therapy Goal     PT, PT/OT      Description:  Goals to be met by: 19     Patient will increase functional independence with mobility by performin. Supine to sit with Modified [place]  2. Sit to stand transfer with Modified [place]  3. Bed to chair transfer with Modified [place] using no AD.  4. Gait  x >250 feet with Modified [place] using Rolling Walker.                       History:     Past Medical History:   Diagnosis Date    Anemia     Anticoagulant long-term use     Arthritis     Coronary artery disease     Diabetes mellitus     Encounter for blood transfusion     Hypertension     Kidney disease, chronic, stage III (GFR 30-59 ml/min)        Past Surgical History:   Procedure Laterality Date    carotid endartarectomy      left    COLONOSCOPY N/A 2014    [facility]    COLONOSCOPY N/A 2012    Per[facility] by [facility] at [facility]    CORONARY ARTERY BYPASS GRAFT      CORONARY STENT PLACEMENT      EGD (ESOPHAGOGASTRODUODENOSCOPY) N/A 2012    Performed by Oscar Bar [facility] at [facility]    INSERT / REPLACE / REMOVE PACEMAKER         Time Tracking:     PT Received On: 19  PT Start Time: 832     PT Stop Time: 905  PT Total Time (min): 33 min     Billable Minutes:  Evaluation 15 and Gait Training 17      Orion Cole, PT  06/09/2019

## 2019-06-09 NOTE — PLAN OF CARE
OK to pull rolling walker from [facility] closet per Devon on-call [phone].  [facility] delivered rolling walker to patient's hospital room.  Patient signed delivery ticket. Completed paperwork place back in closet.  MELISSA Casey

## 2019-06-09 NOTE — PROGRESS NOTES
06/09/19 1643   Final Note   Assessment Type Final Discharge Note   Anticipated Discharge Disposition Home  (with rolling walker)   Discharge plans and expectations educations in teach back method with documentation complete? Yes

## 2019-06-09 NOTE — HOSPITAL COURSE
Patient was admitted to the hospital medicine service for left upper and lower extremity weakness.  CT head negative for infarction or bleed.  Carotid ultrasound within normal limits.  Echo with EF of 55%, mild diastolic dysfunction, and negative bubble study.  CT spine with degenerative changes.  Patient unable to have MRI to confirm stroke due to pacemaker.  He was diagnosed with clinical stroke per neurologist.  He was already on aspirin and statin prior to admission so Plavix was added to his regimen.  PT/OT/ST was consulted.  Patient refused PT evaluation multiple times during his stay and eventually worked with them.  Neurology recommended inpatient rehab consultation but patient stated he had to be discharged today and with sign AMA paperwork if he had too.  PT had recommended outpatient PT so I elected to discharge him with outpatient PT orders and with a rolling walker per their recommendations.  Patient reports he has an  that can assist with him at home and he will have paid caregivers come to his home starting tomorrow.  I will have him follow up with his PCP in a week and with Neurology in 2-3 weeks.  Patient agreeable to discharge plan. Again, he refused to stay for inpatient rehab evaluation and requested discharge today.    Discharge exam  Awake, alert, no apparent distress  Regular rate and rhythm, no murmur  Lungs clear to auscultation bilaterally  5/5 strength left upper extremity, 4/5 strength left lower extremity.

## 2019-06-10 ENCOUNTER — TELEPHONE (OUTPATIENT)
Dept: MEDSURG UNIT | Facility: HOSPITAL | Age: 84
End: 2019-06-10

## 2020-06-18 ENCOUNTER — HOSPITAL ENCOUNTER (OUTPATIENT)
Dept: RADIOLOGY | Facility: HOSPITAL | Age: 85
Discharge: HOME | End: 2020-06-18
Attending: SPECIALIST
Payer: MEDICARE

## 2020-06-18 DIAGNOSIS — I50.9 HEART FAILURE, UNSPECIFIED: ICD-10-CM

## 2020-06-18 DIAGNOSIS — I51.7 CARDIOMEGALY: Primary | ICD-10-CM

## 2020-06-18 DIAGNOSIS — I51.7 CARDIOMEGALY: ICD-10-CM

## 2020-06-18 DIAGNOSIS — I49.5 SINOATRIAL NODE DYSFUNCTION: ICD-10-CM

## 2020-06-18 DIAGNOSIS — I50.9 HEART FAILURE, UNSPECIFIED: Primary | ICD-10-CM

## 2020-06-18 PROCEDURE — 71046 X-RAY EXAM CHEST 2 VIEWS: CPT | Mod: TC

## 2020-06-25 ENCOUNTER — CLINICAL SUPPORT (OUTPATIENT)
Dept: CARDIOLOGY | Facility: HOSPITAL | Age: 85
End: 2020-06-25
Attending: SPECIALIST
Payer: MEDICARE

## 2020-06-25 VITALS — WEIGHT: 161.63 LBS | BODY MASS INDEX: 25.97 KG/M2 | HEIGHT: 66 IN

## 2020-06-25 DIAGNOSIS — I50.9 HEART FAILURE, UNSPECIFIED: ICD-10-CM

## 2020-06-25 PROCEDURE — 93306 TTE W/DOPPLER COMPLETE: CPT

## 2020-06-26 LAB
AORTIC ROOT ANNULUS: 3.12 CM
AORTIC VALVE CUSP SEPERATION: 2.2 CM
AV INDEX (PROSTH): 0.54
AV MEAN GRADIENT: 3 MMHG
AV PEAK GRADIENT: 5 MMHG
AV VALVE AREA: 2.11 CM2
AV VELOCITY RATIO: 61.68
BSA FOR ECHO PROCEDURE: 1.85 M2
CV ECHO LV RWT: 0.47 CM
DOP CALC AO PEAK VEL: 1.07 M/S
DOP CALC AO VTI: 26.72 CM
DOP CALC LVOT AREA: 3.9 CM2
DOP CALC LVOT DIAMETER: 2.24 CM
DOP CALC LVOT PEAK VEL: 66 M/S
DOP CALC LVOT STROKE VOLUME: 56.33 CM3
DOP CALCLVOT PEAK VEL VTI: 14.3 CM
E WAVE DECELERATION TIME: 124.32 MSEC
E/A RATIO: 2.17
E/E' RATIO: 18.18 M/S
ECHO LV POSTERIOR WALL: 1.07 CM (ref 0.6–1.1)
FRACTIONAL SHORTENING: 16 % (ref 28–44)
INTERVENTRICULAR SEPTUM: 1.08 CM (ref 0.6–1.1)
IVRT: 58.01 MSEC
LEFT ATRIUM SIZE: 5.04 CM
LEFT INTERNAL DIMENSION IN SYSTOLE: 3.83 CM (ref 2.1–4)
LEFT VENTRICLE MASS INDEX: 95 G/M2
LEFT VENTRICULAR INTERNAL DIMENSION IN DIASTOLE: 4.56 CM (ref 3.5–6)
LEFT VENTRICULAR MASS: 173.07 G
LV LATERAL E/E' RATIO: 14.29 M/S
LV SEPTAL E/E' RATIO: 25 M/S
MV PEAK A VEL: 0.46 M/S
MV PEAK E VEL: 1 M/S
PISA TR MAX VEL: 3.28 M/S
PV PEAK VELOCITY: 65.19 CM/S
RA PRESSURE: 8 MMHG
RIGHT VENTRICULAR END-DIASTOLIC DIMENSION: 240 CM
TDI LATERAL: 0.07 M/S
TDI SEPTAL: 0.04 M/S
TDI: 0.06 M/S
TR MAX PG: 43 MMHG
TV REST PULMONARY ARTERY PRESSURE: 51 MMHG

## 2020-07-03 DIAGNOSIS — I49.5 SSS (SICK SINUS SYNDROME): Primary | ICD-10-CM

## 2020-07-05 ENCOUNTER — LAB VISIT (OUTPATIENT)
Dept: PRIMARY CARE CLINIC | Facility: CLINIC | Age: 85
End: 2020-07-05
Payer: MEDICARE

## 2020-07-05 DIAGNOSIS — I49.5 SSS (SICK SINUS SYNDROME): ICD-10-CM

## 2020-07-05 PROCEDURE — U0003 INFECTIOUS AGENT DETECTION BY NUCLEIC ACID (DNA OR RNA); SEVERE ACUTE RESPIRATORY SYNDROME CORONAVIRUS 2 (SARS-COV-2) (CORONAVIRUS DISEASE [COVID-19]), AMPLIFIED PROBE TECHNIQUE, MAKING USE OF HIGH THROUGHPUT TECHNOLOGIES AS DESCRIBED BY CMS-2020-01-R: HCPCS

## 2020-07-05 NOTE — PROGRESS NOTES
Pt presented for Pre-procedure drive thru testing. Patient identified using two patient identifiers prior to specimen collection. Questions answered prior to departure and education given.

## 2020-07-06 ENCOUNTER — HOSPITAL ENCOUNTER (OUTPATIENT)
Dept: PREADMISSION TESTING | Facility: HOSPITAL | Age: 85
Discharge: HOME | End: 2020-07-06
Attending: INTERNAL MEDICINE
Payer: MEDICARE

## 2020-07-06 ENCOUNTER — HOSPITAL ENCOUNTER (OUTPATIENT)
Dept: RADIOLOGY | Facility: HOSPITAL | Age: 85
Discharge: HOME | End: 2020-07-06
Attending: INTERNAL MEDICINE
Payer: MEDICARE

## 2020-07-06 VITALS — WEIGHT: 163.81 LBS | HEIGHT: 66 IN | BODY MASS INDEX: 26.33 KG/M2

## 2020-07-06 DIAGNOSIS — Z01.818 PREOP TESTING: Primary | ICD-10-CM

## 2020-07-06 DIAGNOSIS — I49.5 SSS (SICK SINUS SYNDROME): Primary | ICD-10-CM

## 2020-07-06 DIAGNOSIS — I49.5 SSS (SICK SINUS SYNDROME): ICD-10-CM

## 2020-07-06 LAB
ANION GAP SERPL CALC-SCNC: 11 MMOL/L (ref 8–16)
APTT PPP: 32.1 SEC (ref 23.6–33.3)
BASOPHILS # BLD AUTO: 0.02 K/UL (ref 0–0.2)
BASOPHILS NFR BLD: 0.3 % (ref 0–1.9)
BILIRUB UR QL STRIP: NEGATIVE
BUN SERPL-MCNC: 29 MG/DL (ref 8–23)
CALCIUM SERPL-MCNC: 8.4 MG/DL (ref 8.7–10.5)
CHLORIDE SERPL-SCNC: 104 MMOL/L (ref 95–110)
CLARITY UR: CLEAR
CO2 SERPL-SCNC: 22 MMOL/L (ref 23–29)
COLOR UR: YELLOW
CREAT SERPL-MCNC: 1.2 MG/DL (ref 0.5–1.4)
DIFFERENTIAL METHOD: ABNORMAL
EOSINOPHIL # BLD AUTO: 0.2 K/UL (ref 0–0.5)
EOSINOPHIL NFR BLD: 2.7 % (ref 0–8)
ERYTHROCYTE [DISTWIDTH] IN BLOOD BY AUTOMATED COUNT: 13.2 % (ref 11.5–14.5)
EST. GFR  (AFRICAN AMERICAN): >60 ML/MIN/1.73 M^2
EST. GFR  (NON AFRICAN AMERICAN): 54.4 ML/MIN/1.73 M^2
GLUCOSE SERPL-MCNC: 127 MG/DL (ref 70–110)
GLUCOSE UR QL STRIP: NEGATIVE
HCT VFR BLD AUTO: 34.5 % (ref 40–54)
HGB BLD-MCNC: 11.3 G/DL (ref 14–18)
HGB UR QL STRIP: NEGATIVE
IMM GRANULOCYTES # BLD AUTO: 0.02 K/UL (ref 0–0.04)
IMM GRANULOCYTES NFR BLD AUTO: 0.3 % (ref 0–0.5)
INR PPP: 1.4
KETONES UR QL STRIP: NEGATIVE
LEUKOCYTE ESTERASE UR QL STRIP: NEGATIVE
LYMPHOCYTES # BLD AUTO: 0.9 K/UL (ref 1–4.8)
LYMPHOCYTES NFR BLD: 15.7 % (ref 18–48)
MAGNESIUM SERPL-MCNC: 2.2 MG/DL (ref 1.6–2.6)
MCH RBC QN AUTO: 31.3 PG (ref 27–31)
MCHC RBC AUTO-ENTMCNC: 32.8 G/DL (ref 32–36)
MCV RBC AUTO: 96 FL (ref 82–98)
MONOCYTES # BLD AUTO: 0.4 K/UL (ref 0.3–1)
MONOCYTES NFR BLD: 6.2 % (ref 4–15)
MRSA SCREEN BY PCR: NEGATIVE
NEUTROPHILS # BLD AUTO: 4.5 K/UL (ref 1.8–7.7)
NEUTROPHILS NFR BLD: 74.8 % (ref 38–73)
NITRITE UR QL STRIP: NEGATIVE
NRBC BLD-RTO: 0 /100 WBC
PH UR STRIP: 8 [PH] (ref 5–8)
PLATELET # BLD AUTO: 105 K/UL (ref 150–350)
PMV BLD AUTO: 12 FL (ref 9.2–12.9)
POTASSIUM SERPL-SCNC: 3.6 MMOL/L (ref 3.5–5.1)
PROT UR QL STRIP: ABNORMAL
PROTHROMBIN TIME: 16.4 SEC (ref 10.6–14.8)
RBC # BLD AUTO: 3.61 M/UL (ref 4.6–6.2)
SARS-COV-2 RNA RESP QL NAA+PROBE: NOT DETECTED
SODIUM SERPL-SCNC: 137 MMOL/L (ref 136–145)
SP GR UR STRIP: 1.02 (ref 1–1.03)
URN SPEC COLLECT METH UR: ABNORMAL
UROBILINOGEN UR STRIP-ACNC: ABNORMAL EU/DL
WBC # BLD AUTO: 5.97 K/UL (ref 3.9–12.7)

## 2020-07-06 PROCEDURE — 71046 X-RAY EXAM CHEST 2 VIEWS: CPT | Mod: TC

## 2020-07-06 PROCEDURE — 85730 THROMBOPLASTIN TIME PARTIAL: CPT

## 2020-07-06 PROCEDURE — 85610 PROTHROMBIN TIME: CPT

## 2020-07-06 PROCEDURE — 83735 ASSAY OF MAGNESIUM: CPT

## 2020-07-06 PROCEDURE — 93005 ELECTROCARDIOGRAM TRACING: CPT | Performed by: INTERNAL MEDICINE

## 2020-07-06 PROCEDURE — 87641 MR-STAPH DNA AMP PROBE: CPT

## 2020-07-06 PROCEDURE — 80048 BASIC METABOLIC PNL TOTAL CA: CPT

## 2020-07-06 PROCEDURE — 85025 COMPLETE CBC W/AUTO DIFF WBC: CPT

## 2020-07-06 PROCEDURE — 81003 URINALYSIS AUTO W/O SCOPE: CPT

## 2020-07-06 PROCEDURE — 36415 COLL VENOUS BLD VENIPUNCTURE: CPT

## 2020-07-06 RX ORDER — LANOLIN ALCOHOL/MO/W.PET/CERES
100 CREAM (GRAM) TOPICAL DAILY
COMMUNITY

## 2020-07-06 RX ORDER — ATOMOXETINE 40 MG/1
40 CAPSULE ORAL DAILY
COMMUNITY
End: 2020-09-17

## 2020-07-06 RX ORDER — GABAPENTIN 300 MG/1
300 CAPSULE ORAL 2 TIMES DAILY
COMMUNITY
End: 2021-02-16 | Stop reason: SDUPTHER

## 2020-07-06 RX ORDER — FINASTERIDE 5 MG/1
5 TABLET, FILM COATED ORAL DAILY
COMMUNITY
End: 2021-02-16 | Stop reason: SDUPTHER

## 2020-07-06 RX ORDER — CELECOXIB 100 MG/1
100 CAPSULE ORAL DAILY
COMMUNITY
End: 2021-02-16 | Stop reason: SDUPTHER

## 2020-07-06 RX ORDER — NEBIVOLOL 10 MG/1
10 TABLET ORAL DAILY
COMMUNITY
End: 2020-09-17

## 2020-07-06 NOTE — DISCHARGE INSTRUCTIONS
 Nothing to eat or drink after midnight the night before your procedure.   Do not take any medications the morning of your procedure   Bring all your medications with you in the original pill bottles from pharmacy.   If you take blood thinners, ask your doctor if you should stop taking them.   Do your Hibiclens wash the night before and morning of your procedure.   Make arrangements for someone you know to drive you home after your procedure. Taxi and Uber are not acceptable.     Wednesday 07/08/2020 05:30am

## 2020-07-08 ENCOUNTER — HOSPITAL ENCOUNTER (OUTPATIENT)
Facility: HOSPITAL | Age: 85
Discharge: HOME | End: 2020-07-08
Attending: INTERNAL MEDICINE | Admitting: INTERNAL MEDICINE
Payer: MEDICARE

## 2020-07-08 VITALS
OXYGEN SATURATION: 95 % | DIASTOLIC BLOOD PRESSURE: 105 MMHG | TEMPERATURE: 98 F | HEART RATE: 59 BPM | RESPIRATION RATE: 20 BRPM | SYSTOLIC BLOOD PRESSURE: 151 MMHG

## 2020-07-08 DIAGNOSIS — I49.5 SICK SINUS SYNDROME: ICD-10-CM

## 2020-07-08 PROCEDURE — C1894 INTRO/SHEATH, NON-LASER: HCPCS | Performed by: INTERNAL MEDICINE

## 2020-07-08 PROCEDURE — 99152 MOD SED SAME PHYS/QHP 5/>YRS: CPT | Performed by: INTERNAL MEDICINE

## 2020-07-08 PROCEDURE — 27201423 OPTIME MED/SURG SUP & DEVICES STERILE SUPPLY: Performed by: INTERNAL MEDICINE

## 2020-07-08 PROCEDURE — 33228 REMV&REPLC PM GEN DUAL LEAD: CPT

## 2020-07-08 PROCEDURE — 63600175 PHARM REV CODE 636 W HCPCS: Performed by: INTERNAL MEDICINE

## 2020-07-08 PROCEDURE — 96374 THER/PROPH/DIAG INJ IV PUSH: CPT | Performed by: INTERNAL MEDICINE

## 2020-07-08 PROCEDURE — C1751 CATH, INF, PER/CENT/MIDLINE: HCPCS | Performed by: INTERNAL MEDICINE

## 2020-07-08 PROCEDURE — 99153 MOD SED SAME PHYS/QHP EA: CPT | Performed by: INTERNAL MEDICINE

## 2020-07-08 PROCEDURE — 25000003 PHARM REV CODE 250: Performed by: INTERNAL MEDICINE

## 2020-07-08 PROCEDURE — C1785 PMKR, DUAL, RATE-RESP: HCPCS | Performed by: INTERNAL MEDICINE

## 2020-07-08 DEVICE — PULSE GENERATOR
Type: IMPLANTABLE DEVICE | Site: HEART | Status: FUNCTIONAL
Brand: ASSURITY MRI™

## 2020-07-08 RX ORDER — LORAZEPAM 0.5 MG/1
0.5 TABLET ORAL
Status: COMPLETED | OUTPATIENT
Start: 2020-07-08 | End: 2020-07-08

## 2020-07-08 RX ORDER — ACETAMINOPHEN 325 MG/1
650 TABLET ORAL EVERY 4 HOURS PRN
Status: DISCONTINUED | OUTPATIENT
Start: 2020-07-08 | End: 2020-07-08 | Stop reason: HOSPADM

## 2020-07-08 RX ORDER — FENTANYL CITRATE 50 UG/ML
INJECTION, SOLUTION INTRAMUSCULAR; INTRAVENOUS
Status: DISCONTINUED | OUTPATIENT
Start: 2020-07-08 | End: 2020-07-08 | Stop reason: HOSPADM

## 2020-07-08 RX ORDER — CEFAZOLIN SODIUM 1 G/50ML
2 SOLUTION INTRAVENOUS ONCE
Status: DISCONTINUED | OUTPATIENT
Start: 2020-07-08 | End: 2020-07-08 | Stop reason: HOSPADM

## 2020-07-08 RX ORDER — LIDOCAINE HYDROCHLORIDE 10 MG/ML
INJECTION, SOLUTION EPIDURAL; INFILTRATION; INTRACAUDAL; PERINEURAL
Status: DISCONTINUED | OUTPATIENT
Start: 2020-07-08 | End: 2020-07-08 | Stop reason: HOSPADM

## 2020-07-08 RX ORDER — MIDAZOLAM HYDROCHLORIDE 1 MG/ML
INJECTION INTRAMUSCULAR; INTRAVENOUS
Status: DISCONTINUED | OUTPATIENT
Start: 2020-07-08 | End: 2020-07-08 | Stop reason: HOSPADM

## 2020-07-08 RX ORDER — CEFAZOLIN SODIUM 1 G/3ML
INJECTION, POWDER, FOR SOLUTION INTRAMUSCULAR; INTRAVENOUS
Status: DISCONTINUED | OUTPATIENT
Start: 2020-07-08 | End: 2020-07-08 | Stop reason: HOSPADM

## 2020-07-08 RX ORDER — HYDRALAZINE HYDROCHLORIDE 20 MG/ML
10 INJECTION INTRAMUSCULAR; INTRAVENOUS ONCE
Status: COMPLETED | OUTPATIENT
Start: 2020-07-08 | End: 2020-07-08

## 2020-07-08 RX ORDER — CEFAZOLIN SODIUM 2 G/50ML
2 SOLUTION INTRAVENOUS ONCE
Status: DISCONTINUED | OUTPATIENT
Start: 2020-07-08 | End: 2020-07-08

## 2020-07-08 RX ORDER — MUPIROCIN 20 MG/G
OINTMENT TOPICAL
Status: DISCONTINUED | OUTPATIENT
Start: 2020-07-08 | End: 2020-07-08 | Stop reason: HOSPADM

## 2020-07-08 RX ORDER — CEFAZOLIN SODIUM 1 G/50ML
1 SOLUTION INTRAVENOUS
Status: COMPLETED | OUTPATIENT
Start: 2020-07-08 | End: 2020-07-08

## 2020-07-08 RX ADMIN — LORAZEPAM 0.5 MG: 0.5 TABLET ORAL at 06:07

## 2020-07-08 RX ADMIN — CEFAZOLIN SODIUM 1 G: 1 SOLUTION INTRAVENOUS at 01:07

## 2020-07-08 RX ADMIN — HYDRALAZINE HYDROCHLORIDE 10 MG: 20 INJECTION INTRAMUSCULAR; INTRAVENOUS at 09:07

## 2020-07-08 NOTE — Clinical Note
A dressing is applied to the right femoral vein puncture site. tegaderm and gauze used to secure sheath and temp pacer in place

## 2020-07-08 NOTE — DISCHARGE INSTRUCTIONS
Post Pacemaker Discharge Instructions:   DO NOT remove the flexan dressing that is covering the pacemaker incision until instructed by  your physician in your follow up appointment (usually around 10 days).   Inspect the site daily for tenderness, discharge, or signs of infection.   Keep dressing dry and intact   Do not apply powders or lotions to incision site until healed   Avoid stress to the incision/suture site. Avoid any kind of trauma to the pacemaker site.   Check your pulse daily and notify your physician if the rate is less than the pacemaker set rate.    Always carry your pacemaker ID card with you. A permanent card will be mailed to you in 6-8 weeks.    Avoid areas of high voltages such as power plants, radio transmitters, or welding equipment. You may walk through anti-theft doorways, but do not stand near them for any length of time.    Inform all Physicians and Dentists that you have a pacemaker.    Your pacemaker will need to be checked for proper functioning at intervals determined by your physician. It is very important to keep these appointments or your pacemaker may not function properly.     Activity:   Limit your activity for the next 48 hours.   You may use your arms but avoid reaching overhead for 6 weeks with the arm on the same side as the pacemaker.   Do not lift anything grater than 5 pounds for one month with the arm on the same side as your pacemaker.   No driving until instructed by you physician at your follow up appointment.     CALL YOUR DOCTOR IMMEDIATELY IF YOU EXPERIENCE ANY OF THE FOLLOWING:   Chest pain, palpitations, shortness of breath, excessive dizziness, fainting, nausea or vomiting.    Signs of infection including: swelling, discharge, redness, warmth , pain, fever

## 2020-07-08 NOTE — Clinical Note
Generator Pocket opened at the left  upper chest  with blunt dissection, plasma blade and sharp dissection.

## 2020-07-08 NOTE — Clinical Note
The site was marked. Prepped: right groin and left chest. Prepped with: ChloraPrep. The site was clipped. The patient was draped.

## 2020-07-08 NOTE — Clinical Note
A dressing is applied to the incision. gazue tegaderm applied to both right groin and left chest sites

## 2020-07-17 DIAGNOSIS — R60.9 EDEMA: Primary | ICD-10-CM

## 2020-07-23 ENCOUNTER — HOSPITAL ENCOUNTER (OUTPATIENT)
Dept: RADIOLOGY | Facility: HOSPITAL | Age: 85
Discharge: HOME | End: 2020-07-23
Attending: SPECIALIST
Payer: MEDICARE

## 2020-07-23 DIAGNOSIS — R60.9 EDEMA: ICD-10-CM

## 2020-07-23 PROCEDURE — 93970 EXTREMITY STUDY: CPT | Mod: TC,PO

## 2020-09-17 ENCOUNTER — OFFICE VISIT (OUTPATIENT)
Dept: CARDIOLOGY | Facility: CLINIC | Age: 85
End: 2020-09-17
Payer: MEDICARE

## 2020-09-17 VITALS
SYSTOLIC BLOOD PRESSURE: 122 MMHG | TEMPERATURE: 97 F | BODY MASS INDEX: 24.59 KG/M2 | HEART RATE: 58 BPM | DIASTOLIC BLOOD PRESSURE: 60 MMHG | HEIGHT: 66 IN | WEIGHT: 153 LBS | OXYGEN SATURATION: 96 %

## 2020-09-17 DIAGNOSIS — I15.2 HYPERTENSION ASSOCIATED WITH DIABETES: ICD-10-CM

## 2020-09-17 DIAGNOSIS — I95.2 HYPOTENSION DUE TO DRUGS: ICD-10-CM

## 2020-09-17 DIAGNOSIS — E11.59 HYPERTENSION ASSOCIATED WITH DIABETES: ICD-10-CM

## 2020-09-17 DIAGNOSIS — Z86.79 HISTORY OF ASCVD: ICD-10-CM

## 2020-09-17 DIAGNOSIS — N18.30 CKD (CHRONIC KIDNEY DISEASE) STAGE 3, GFR 30-59 ML/MIN: Primary | ICD-10-CM

## 2020-09-17 DIAGNOSIS — I50.22 CHF (CONGESTIVE HEART FAILURE), NYHA CLASS II, CHRONIC, SYSTOLIC: ICD-10-CM

## 2020-09-17 PROCEDURE — 1159F PR MEDICATION LIST DOCUMENTED IN MEDICAL RECORD: ICD-10-PCS | Mod: S$GLB,,, | Performed by: SPECIALIST

## 2020-09-17 PROCEDURE — 1126F AMNT PAIN NOTED NONE PRSNT: CPT | Mod: S$GLB,,, | Performed by: SPECIALIST

## 2020-09-17 PROCEDURE — 3288F FALL RISK ASSESSMENT DOCD: CPT | Mod: CPTII,S$GLB,, | Performed by: SPECIALIST

## 2020-09-17 PROCEDURE — 1126F PR PAIN SEVERITY QUANTIFIED, NO PAIN PRESENT: ICD-10-PCS | Mod: S$GLB,,, | Performed by: SPECIALIST

## 2020-09-17 PROCEDURE — 1100F PTFALLS ASSESS-DOCD GE2>/YR: CPT | Mod: CPTII,S$GLB,, | Performed by: SPECIALIST

## 2020-09-17 PROCEDURE — 99214 OFFICE O/P EST MOD 30 MIN: CPT | Mod: S$GLB,,, | Performed by: SPECIALIST

## 2020-09-17 PROCEDURE — 99214 PR OFFICE/OUTPT VISIT, EST, LEVL IV, 30-39 MIN: ICD-10-PCS | Mod: S$GLB,,, | Performed by: SPECIALIST

## 2020-09-17 PROCEDURE — 3288F PR FALLS RISK ASSESSMENT DOCUMENTED: ICD-10-PCS | Mod: CPTII,S$GLB,, | Performed by: SPECIALIST

## 2020-09-17 PROCEDURE — 1159F MED LIST DOCD IN RCRD: CPT | Mod: S$GLB,,, | Performed by: SPECIALIST

## 2020-09-17 PROCEDURE — 1100F PR PT FALLS ASSESS DOC 2+ FALLS/FALL W/INJURY/YR: ICD-10-PCS | Mod: CPTII,S$GLB,, | Performed by: SPECIALIST

## 2020-09-17 RX ORDER — FUROSEMIDE 20 MG/1
20 TABLET ORAL EVERY OTHER DAY
COMMUNITY
End: 2021-04-19

## 2020-09-17 RX ORDER — LISINOPRIL AND HYDROCHLOROTHIAZIDE 10; 12.5 MG/1; MG/1
1 TABLET ORAL DAILY
Qty: 90 TABLET | Refills: 3 | Status: SHIPPED | OUTPATIENT
Start: 2020-09-17 | End: 2021-02-16 | Stop reason: SDUPTHER

## 2020-09-17 NOTE — PROGRESS NOTES
Subjective:    Patient ID:  Rafael Bocanegra Jr. is a 86 y.o. male who presents for   Coronary Artery Disease and Hypertension    HPI       Review of patient's allergies indicates:  No Known Allergies    Past Medical History:   Diagnosis Date    Acid reflux     Ambulates with cane     Anemia     Anticoagulant long-term use     Arthritis     Basal cell carcinoma of skin     Cataract     Coronary artery disease     CVA (cerebral vascular accident)     Depression     Diabetes mellitus     Encounter for blood transfusion     Hearing aid worn     History of falling     Hyperlipemia     Hypertension     Kidney disease, chronic, stage III (GFR 30-59 ml/min)     Neuropathy     Pacemaker     Pacemaker at end of battery life     Prostate enlargement      Past Surgical History:   Procedure Laterality Date    CAROTID ENDARTERECTOMY Right     CATARACT EXTRACTION, BILATERAL      CORONARY ARTERY BYPASS GRAFT      3V    CORONARY STENT PLACEMENT      INSERT / REPLACE / REMOVE PACEMAKER      REPLACEMENT OF PACEMAKER GENERATOR N/A 2020    Procedure: REMOVAL PACEMAKER ([place]) INSERTION, CARDIAC PACEMAKER, DUAL CHAMBER   ([place]);  Surgeon: Willam Dudley MD;  Location: [facility] CATH[facility]EP LAB;  Service: Cardiology;  Laterality: N/A;    SKIN CANCER DESTRUCTION      face     Social History     Tobacco Use    Smoking status: Former Smoker     Packs/day: 2.00     Years: 20.00     Pack years: 40.00     Quit date: 1975     Years since quittin.2    Smokeless tobacco: Never Used   Substance Use Topics    Alcohol use: Not Currently    Drug use: No        Review of Systems     ROS        Objective:        Vitals:    20 1453   BP: 122/60   Pulse: (!) 58   Temp: 97.3 °F (36.3 °C)       Lab Results   Component Value Date    WBC 5.97 2020    HGB 11.3 (L) 2020    HCT 34.5 (L) 2020     (L) 2020    CHOL 183 2019    TRIG 148 2019    HDL 41 2019    ALT  18 06/09/2019    AST 19 06/09/2019     07/06/2020    K 3.6 07/06/2020     07/06/2020    CREATININE 1.2 07/06/2020    BUN 29 (H) 07/06/2020    CO2 22 (L) 07/06/2020    TSH 2.995 06/08/2019    INR 1.4 07/06/2020    HGBA1C 6.1 (H) 06/07/2019        ECHOCARDIOGRAM RESULTS  Results for orders placed in visit on 06/25/20   Echo Color Flow Doppler? Yes    Narrative · Concentric left ventricular remodeling.  · Mildly decreased left ventricular systolic function. The estimated   ejection fraction is 42%.  · Grade III (severe) left ventricular diastolic dysfunction consistent   with restrictive physiology.  · Local segmental wall motion abnormalities.  · Low normal right ventricular systolic function.  · Moderate left atrial enlargement.  · Moderate aortic regurgitation.  · Mild-to-moderate mitral regurgitation.  · Mild to moderate tricuspid regurgitation.  · Mild pulmonary hypertension present.  · Intermediate central venous pressure (8 mmHg).     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 Rate : 070 BPM     Atrial Rate : 070 BPM     P-R Int : 000 ms          QRS Dur : 182 ms      QT Int : 510 ms       P-R-T Axes : 000 -87 096 degrees     QTc Int : 550 ms    Ventricular-paced rhythm  Abnormal ECG  When compared with ECG of 07-JUN-2019 16:22,  Vent. rate has decreased BY   8 BPM  Confirmed by Madi Buckner MD [id number]) on 7/7/2020 10:03:33 PM    Referred By:  BONY           Confirmed By:Madi Buckner MD     Results for orders placed in visit on 06/25/20   Echo Color Flow Doppler? Yes    Narrative · Concentric left ventricular remodeling.  ·  Mildly decreased left ventricular systolic function. The estimated   ejection fraction is 42%.  · Grade III (severe) left ventricular diastolic dysfunction consistent   with restrictive physiology.  · Local segmental wall motion abnormalities.  · Low normal right ventricular systolic function.  · Moderate left atrial enlargement.  · Moderate aortic regurgitation.  · Mild-to-moderate mitral regurgitation.  · Mild to moderate tricuspid regurgitation.  · Mild pulmonary hypertension present.  · Intermediate central venous pressure (8 mmHg).     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    PHYSICAL EXAM    Physical Exam     Medication List with Changes/Refills   New Medications    LISINOPRIL-HYDROCHLOROTHIAZIDE (PRINZIDE,ZESTORETIC) 10-12.5 MG PER TABLET    Take 1 tablet by mouth once daily.   Current Medications    AMLODIPINE (NORVASC) 5 MG TABLET    amlodipine 5 mg tablet   Take 1 tablet every day by oral route as directed.    ASPIRIN 81 MG CHEW    Take 1 tablet (81 mg total) by mouth once daily.    CARVEDILOL (COREG) 6.25 MG TABLET    Take 6.25 mg by mouth 2 (two) times daily with meals.    CELECOXIB (CELEBREX) 100 MG CAPSULE    Take 100 mg by mouth once daily.    CLOPIDOGREL (PLAVIX) 75 MG TABLET    Take 1 tablet (75 mg total) by mouth once daily.    CYANOCOBALAMIN (VITAMIN B-12) 1000 MCG TABLET    Take 100 mcg by mouth once daily.    EZETIMIBE (ZETIA) 10 MG TABLET    Take 10 mg by mouth once daily.    FINASTERIDE (PROSCAR) 5 MG TABLET    Take 5 mg by mouth once daily.    FUROSEMIDE (LASIX) 20 MG TABLET    Take 20 mg by mouth every other day. 3 x week     GABAPENTIN (NEURONTIN) 300 MG CAPSULE    Take 300 mg by mouth 2 (two) times daily.    ISOSORBIDE MONONITRATE (IMDUR) 30 MG 24 HR TABLET    Take 30 mg by  mouth once daily.    MULTIVITAMIN W-MINERALS/LUTEIN (CENTRUM SILVER ORAL)    Take 1 tablet by mouth once daily.     NITROGLYCERIN (NITROSTAT) 0.4 MG SL TABLET    Place 0.4 mg under the tongue every 5 (five) minutes as needed for Chest pain. Seek medical help if pain is not relieved by the third dose.    OMEPRAZOLE (PRILOSEC) 20 MG CAPSULE    Take 40 mg by mouth once daily.      POTASSIUM CHLORIDE (MICRO-K) 10 MEQ CPSR    Take 20 mEq by mouth once daily.     PRAVASTATIN (PRAVACHOL) 20 MG TABLET    Take 20 mg by mouth once daily.    SERTRALINE (ZOLOFT) 100 MG TABLET    Take 100 mg by mouth 2 (two) times daily.     TERAZOSIN (HYTRIN) 5 MG CAPSULE    Take 5 mg by mouth nightly.     Discontinued Medications    ATOMOXETINE (STRATTERA) 40 MG CAPSULE    Take 40 mg by mouth once daily.    LISINOPRIL-HYDROCHLOROTHIAZIDE (PRINZIDE,ZESTORETIC) 20-12.5 MG PER TABLET    Take 1 tablet by mouth once daily.      NEBIVOLOL (BYSTOLIC) 10 MG TAB    Take 10 mg by mouth once daily.           Assessment:       1. CKD (chronic kidney disease) stage 3, GFR 30-59 ml/min    2. Hypotension due to drugs    3. CHF (congestive heart failure), NYHA class II, chronic, systolic    4. History of ASCVD         Plan:       Problem List Items Addressed This Visit        Renal/    CKD (chronic kidney disease) stage 3, GFR 30-59 ml/min - Primary    Relevant Orders    Basic metabolic panel      Other Visit Diagnoses     Hypotension due to drugs        CHF (congestive heart failure), NYHA class II, chronic, systolic        Relevant Orders    B-TYPE NATRIURETIC PEPTIDE    History of ASCVD             Testing testing  Follow up in about 2 months (around 11/17/2020).

## 2020-09-17 NOTE — PROGRESS NOTES
Subjective:    Patient ID:  Rafael Bocanegra Jr. is a 86 y.o. male who presents for   Coronary Artery Disease and Hypertension    HPI    patient is feeling well.  He has lost a little bit of weight and is weak when he stands up and today am getting pressures of  standing with a pulse of 70.  He has not had syncope his edema is better      Review of patient's allergies indicates:  No Known Allergies    Past Medical History:   Diagnosis Date    Acid reflux     Ambulates with cane     Anemia     Anticoagulant long-term use     Arthritis     Basal cell carcinoma of skin     Cataract     Coronary artery disease     CVA (cerebral vascular accident)     Depression     Diabetes mellitus     Encounter for blood transfusion     Hearing aid worn     History of falling     Hyperlipemia     Hypertension     Kidney disease, chronic, stage III (GFR 30-59 ml/min)     Neuropathy     Pacemaker     Pacemaker at end of battery life     Prostate enlargement      Past Surgical History:   Procedure Laterality Date    CAROTID ENDARTERECTOMY Right     CATARACT EXTRACTION, BILATERAL      CORONARY ARTERY BYPASS GRAFT      3V    CORONARY STENT PLACEMENT      INSERT / REPLACE / REMOVE PACEMAKER      REPLACEMENT OF PACEMAKER GENERATOR N/A 2020    Procedure: REMOVAL PACEMAKER ([place]) INSERTION, CARDIAC PACEMAKER, DUAL CHAMBER   ([facility]);  Surgeon: Willam Dudley MD;  Location: [facility]ATH[facility]EP [facility];  Service: Cardiology;  Laterality: N/A;    SKIN CANCER DESTRUCTION      face     Social History     Tobacco Use    Smoking status: Former Smoker     Packs/day: 2.00     Years: 20.00     Pack years: 40.00     Quit date: 1975     Years since quittin.2    Smokeless tobacco: Never Used   Substance Use Topics    Alcohol use: Not Currently    Drug use: No        Review of Systems   Abdomen is been okay  ROS HEENT is okay  He is dizzy standing  No chest pain  He is little bit more forgetful and  weight         Objective:        Vitals:    09/17/20 1453   BP: 122/60   Pulse: (!) 58   Temp: 97.3 °F (36.3 °C)       Lab Results   Component Value Date    WBC 5.97 07/06/2020    HGB 11.3 (L) 07/06/2020    HCT 34.5 (L) 07/06/2020     (L) 07/06/2020    CHOL 183 06/08/2019    TRIG 148 06/08/2019    HDL 41 06/08/2019    ALT 18 06/09/2019    AST 19 06/09/2019     07/06/2020    K 3.6 07/06/2020     07/06/2020    CREATININE 1.2 07/06/2020    BUN 29 (H) 07/06/2020    CO2 22 (L) 07/06/2020    TSH 2.995 06/08/2019    INR 1.4 07/06/2020    HGBA1C 6.1 (H) 06/07/2019        ECHOCARDIOGRAM RESULTS  Results for orders placed in visit on 06/25/20   Echo Color Flow Doppler? Yes    Narrative · Concentric left ventricular remodeling.  · Mildly decreased left ventricular systolic function. The estimated   ejection fraction is 42%.  · Grade III (severe) left ventricular diastolic dysfunction consistent   with restrictive physiology.  · Local segmental wall motion abnormalities.  · Low normal right ventricular systolic function.  · Moderate left atrial enlargement.  · Moderate aortic regurgitation.  · Mild-to-moderate mitral regurgitation.  · Mild to moderate tricuspid regurgitation.  · Mild pulmonary hypertension present.  · Intermediate central venous pressure (8 mmHg).     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 Rate : 070 BPM     Atrial Rate : 070 BPM     P-R Int : 000 ms          QRS Dur : 182 ms      QT Int : 510 ms       P-R-T Axes : 000 -87 096 degrees     QTc Int : 550 ms    Ventricular-paced rhythm  Abnormal ECG  When compared with ECG  of 07-JUN-2019 16:22,  Vent. rate has decreased BY   8 BPM  Confirmed by Madi Buckner MD ([id number]) on 7/7/2020 10:03:33 PM    Referred By:  BONY           Confirmed By:Madi Buckner MD     Results for orders placed in visit on 06/25/20   Echo Color Flow Doppler? Yes    Narrative · Concentric left ventricular remodeling.  · Mildly decreased left ventricular systolic function. The estimated   ejection fraction is 42%.  · Grade III (severe) left ventricular diastolic dysfunction consistent   with restrictive physiology.  · Local segmental wall motion abnormalities.  · Low normal right ventricular systolic function.  · Moderate left atrial enlargement.  · Moderate aortic regurgitation.  · Mild-to-moderate mitral regurgitation.  · Mild to moderate tricuspid regurgitation.  · Mild pulmonary hypertension present.  · Intermediate central venous pressure (8 mmHg).     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    PHYSICAL EXAM    Physical Exam of blood pressure is 90 to 100 standing  Of pulse is 70  Lungs clear  Cardiac S4 1/6 systolic murmur  Legs mild edema left lower leg but no edema in the right lower extremity    Medication List with Changes/Refills   New Medications    LISINOPRIL-HYDROCHLOROTHIAZIDE (PRINZIDE,ZESTORETIC) 10-12.5 MG PER TABLET    Take 1 tablet by mouth once daily.   Current Medications    AMLODIPINE (NORVASC) 5 MG TABLET    amlodipine 5 mg tablet   Take 1 tablet every day by oral route as directed.    ASPIRIN 81 MG CHEW    Take 1 tablet (81 mg total) by mouth once daily.    CARVEDILOL (COREG) 6.25 MG TABLET    Take 6.25 mg by mouth 2 (two) times daily with meals.    CELECOXIB (CELEBREX) 100 MG CAPSULE    Take 100 mg by mouth once daily.    CLOPIDOGREL (PLAVIX) 75 MG TABLET    Take 1 tablet  (75 mg total) by mouth once daily.    CYANOCOBALAMIN (VITAMIN B-12) 1000 MCG TABLET    Take 100 mcg by mouth once daily.    EZETIMIBE (ZETIA) 10 MG TABLET    Take 10 mg by mouth once daily.    FINASTERIDE (PROSCAR) 5 MG TABLET    Take 5 mg by mouth once daily.    FUROSEMIDE (LASIX) 20 MG TABLET    Take 20 mg by mouth every other day. 3 x week     GABAPENTIN (NEURONTIN) 300 MG CAPSULE    Take 300 mg by mouth 2 (two) times daily.    ISOSORBIDE MONONITRATE (IMDUR) 30 MG 24 HR TABLET    Take 30 mg by mouth once daily.    MULTIVITAMIN W-MINERALS/LUTEIN (CENTRUM SILVER ORAL)    Take 1 tablet by mouth once daily.     NITROGLYCERIN (NITROSTAT) 0.4 MG SL TABLET    Place 0.4 mg under the tongue every 5 (five) minutes as needed for Chest pain. Seek medical help if pain is not relieved by the third dose.    OMEPRAZOLE (PRILOSEC) 20 MG CAPSULE    Take 40 mg by mouth once daily.      POTASSIUM CHLORIDE (MICRO-K) 10 MEQ CPSR    Take 20 mEq by mouth once daily.     PRAVASTATIN (PRAVACHOL) 20 MG TABLET    Take 20 mg by mouth once daily.    SERTRALINE (ZOLOFT) 100 MG TABLET    Take 100 mg by mouth 2 (two) times daily.     TERAZOSIN (HYTRIN) 5 MG CAPSULE    Take 5 mg by mouth nightly.     Discontinued Medications    ATOMOXETINE (STRATTERA) 40 MG CAPSULE    Take 40 mg by mouth once daily.    LISINOPRIL-HYDROCHLOROTHIAZIDE (PRINZIDE,ZESTORETIC) 20-12.5 MG PER TABLET    Take 1 tablet by mouth once daily.      NEBIVOLOL (BYSTOLIC) 10 MG TAB    Take 10 mg by mouth once daily.           Assessment:       1. CKD (chronic kidney disease) stage 3, GFR 30-59 ml/min    2. Hypotension due to drugs    3. CHF (congestive heart failure), NYHA class II, chronic, systolic    4. History of ASCVD         Plan:     Decrease lisinopril to 10 12 and half q.day and get full lab workup and return in 2 months  Problem List Items Addressed This Visit        Renal/    CKD (chronic kidney disease) stage 3, GFR 30-59 ml/min - Primary    Relevant Orders    Basic  metabolic panel      Other Visit Diagnoses     Hypotension due to drugs        CHF (congestive heart failure), NYHA class II, chronic, systolic        Relevant Orders    B-TYPE NATRIURETIC PEPTIDE    History of ASCVD               Follow up in about 2 months (around 11/17/2020).

## 2020-09-30 ENCOUNTER — HOSPITAL ENCOUNTER (OUTPATIENT)
Dept: CARDIOLOGY | Facility: CLINIC | Age: 85
Discharge: HOME | End: 2020-09-30
Attending: SPECIALIST
Payer: MEDICARE

## 2020-09-30 DIAGNOSIS — Z00.00 ROUTINE CHECK-UP: ICD-10-CM

## 2020-09-30 PROCEDURE — 93280 PM DEVICE PROGR EVAL DUAL: CPT | Mod: S$GLB,,, | Performed by: SPECIALIST

## 2020-09-30 PROCEDURE — 93280 CARDIAC DEVICE CHECK - IN CLINIC & HOSPITAL: ICD-10-PCS | Mod: S$GLB,,, | Performed by: SPECIALIST

## 2020-11-12 DIAGNOSIS — I49.5 SICK SINUS SYNDROME: Primary | ICD-10-CM

## 2020-11-18 ENCOUNTER — OFFICE VISIT (OUTPATIENT)
Dept: CARDIOLOGY | Facility: CLINIC | Age: 85
End: 2020-11-18
Payer: MEDICARE

## 2020-11-18 VITALS
TEMPERATURE: 98 F | HEIGHT: 66 IN | SYSTOLIC BLOOD PRESSURE: 140 MMHG | OXYGEN SATURATION: 97 % | HEART RATE: 69 BPM | BODY MASS INDEX: 23.63 KG/M2 | WEIGHT: 147 LBS | DIASTOLIC BLOOD PRESSURE: 70 MMHG

## 2020-11-18 DIAGNOSIS — R63.8 HYPOMETABOLISM: ICD-10-CM

## 2020-11-18 DIAGNOSIS — N18.32 STAGE 3B CHRONIC KIDNEY DISEASE: Primary | ICD-10-CM

## 2020-11-18 DIAGNOSIS — I25.10 ASCVD (ARTERIOSCLEROTIC CARDIOVASCULAR DISEASE): ICD-10-CM

## 2020-11-18 DIAGNOSIS — I49.5 SSS (SICK SINUS SYNDROME): ICD-10-CM

## 2020-11-18 DIAGNOSIS — E03.8 TSH (THYROID-STIMULATING HORMONE DEFICIENCY): ICD-10-CM

## 2020-11-18 DIAGNOSIS — I70.0 AORTIC ATHEROSCLEROSIS: ICD-10-CM

## 2020-11-18 DIAGNOSIS — I63.9 CEREBROVASCULAR ACCIDENT (CVA), UNSPECIFIED MECHANISM: ICD-10-CM

## 2020-11-18 PROCEDURE — 1126F PR PAIN SEVERITY QUANTIFIED, NO PAIN PRESENT: ICD-10-PCS | Mod: S$GLB,,, | Performed by: SPECIALIST

## 2020-11-18 PROCEDURE — 1100F PR PT FALLS ASSESS DOC 2+ FALLS/FALL W/INJURY/YR: ICD-10-PCS | Mod: CPTII,S$GLB,, | Performed by: SPECIALIST

## 2020-11-18 PROCEDURE — 1126F AMNT PAIN NOTED NONE PRSNT: CPT | Mod: S$GLB,,, | Performed by: SPECIALIST

## 2020-11-18 PROCEDURE — 3288F PR FALLS RISK ASSESSMENT DOCUMENTED: ICD-10-PCS | Mod: CPTII,S$GLB,, | Performed by: SPECIALIST

## 2020-11-18 PROCEDURE — 1159F MED LIST DOCD IN RCRD: CPT | Mod: S$GLB,,, | Performed by: SPECIALIST

## 2020-11-18 PROCEDURE — 99214 OFFICE O/P EST MOD 30 MIN: CPT | Mod: S$GLB,,, | Performed by: SPECIALIST

## 2020-11-18 PROCEDURE — 99214 PR OFFICE/OUTPT VISIT, EST, LEVL IV, 30-39 MIN: ICD-10-PCS | Mod: S$GLB,,, | Performed by: SPECIALIST

## 2020-11-18 PROCEDURE — 3288F FALL RISK ASSESSMENT DOCD: CPT | Mod: CPTII,S$GLB,, | Performed by: SPECIALIST

## 2020-11-18 PROCEDURE — 1100F PTFALLS ASSESS-DOCD GE2>/YR: CPT | Mod: CPTII,S$GLB,, | Performed by: SPECIALIST

## 2020-11-18 PROCEDURE — 1159F PR MEDICATION LIST DOCUMENTED IN MEDICAL RECORD: ICD-10-PCS | Mod: S$GLB,,, | Performed by: SPECIALIST

## 2020-11-18 RX ORDER — ATOMOXETINE 40 MG/1
40 CAPSULE ORAL DAILY
COMMUNITY
End: 2021-02-16 | Stop reason: SDUPTHER

## 2020-11-18 NOTE — PROGRESS NOTES
Subjective:    Patient ID:  Rafael Bocanegra Jr. is a 86 y.o. male who presents for   Coronary Artery Disease, Cerebrovascular Accident, and Congestive Heart Failure    HPI last visit  Lisinopril 20/12.5 decreased to  10 12.5   Variable bp    Hypotension   bettger   Exercising  He feels better spreading meds to 4x/day   Patient has done better since recovered lisinopril down and he has decreased his medicine slowly spread out taken them over the day is not having as many falls  He is not working now  Denies any chest pain    Review of patient's allergies indicates:  No Known Allergies    Past Medical History:   Diagnosis Date    Acid reflux     Ambulates with cane     Anemia     Anticoagulant long-term use     Arthritis     Basal cell carcinoma of skin     Cataract     Coronary artery disease     CVA (cerebral vascular accident)     Depression     Diabetes mellitus     Encounter for blood transfusion     Hearing aid worn     History of falling     Hyperlipemia     Hypertension     Kidney disease, chronic, stage III (GFR 30-59 ml/min)     Neuropathy     Pacemaker     Pacemaker at end of battery life     Prostate enlargement     SSS (sick sinus syndrome)      Past Surgical History:   Procedure Laterality Date    CAROTID ENDARTERECTOMY Right     CATARACT EXTRACTION, BILATERAL      CORONARY ARTERY BYPASS GRAFT      3V    CORONARY STENT PLACEMENT      INSERT / REPLACE / REMOVE PACEMAKER      REPLACEMENT OF PACEMAKER GENERATOR N/A 2020    Procedure: REMOVAL PACEMAKER ([place]) INSERTION, CARDIAC PACEMAKER, DUAL CHAMBER   ([facility]);  Surgeon: Willam Dudley MD;  Location: [facility] CATH/EP LAB;  Service: Cardiology;  Laterality: N/A;    SKIN CANCER DESTRUCTION      face     Social History     Tobacco Use    Smoking status: Former Smoker     Packs/day: 2.00     Years: 20.00     Pack years: 40.00     Quit date: 1975     Years since quittin.4    Smokeless tobacco: Never Used    Substance Use Topics    Alcohol use: Not Currently    Drug use: No        Review of Systems     Review of Systems   Constitution: Positive for malaise/fatigue and night sweats.   Eyes: Positive for blurred vision.   Cardiovascular: Positive for dyspnea on exertion. Negative for chest pain.   Respiratory: Positive for cough and shortness of breath.    Musculoskeletal: Positive for arthritis and falls.   Gastrointestinal: Negative.    Genitourinary: Negative.    Neurological: Positive for disturbances in coordination and excessive daytime sleepiness.   Psychiatric/Behavioral: Positive for altered mental status and memory loss.           Objective:        Vitals:    11/18/20 1624   BP: (!) 140/70   Pulse: 69   Temp: 97.9 °F (36.6 °C)       Lab Results   Component Value Date    WBC 5.97 07/06/2020    HGB 11.3 (L) 07/06/2020    HCT 34.5 (L) 07/06/2020     (L) 07/06/2020    CHOL 183 06/08/2019    TRIG 148 06/08/2019    HDL 41 06/08/2019    ALT 18 06/09/2019    AST 19 06/09/2019     07/06/2020    K 3.6 07/06/2020     07/06/2020    CREATININE 1.2 07/06/2020    BUN 29 (H) 07/06/2020    CO2 22 (L) 07/06/2020    TSH 2.995 06/08/2019    INR 1.4 07/06/2020    HGBA1C 6.1 (H) 06/07/2019        ECHOCARDIOGRAM RESULTS  Results for orders placed in visit on 06/25/20   Echo Color Flow Doppler? Yes    Narrative · Concentric left ventricular remodeling.  · Mildly decreased left ventricular systolic function. The estimated   ejection fraction is 42%.  · Grade III (severe) left ventricular diastolic dysfunction consistent   with restrictive physiology.  · Local segmental wall motion abnormalities.  · Low normal right ventricular systolic function.  · Moderate left atrial enlargement.  · Moderate aortic regurgitation.  · Mild-to-moderate mitral regurgitation.  · Mild to moderate tricuspid regurgitation.  · Mild pulmonary hypertension present.  · Intermediate central venous pressure (8 mmHg).     Old inferior septal  and apical myocardial infarction with ejection   fraction 41%  Grade 3 diastolic dysfunction and pulmonary hypertension mild-to-moderate  Pacemaker seen within the right ventricle         CURRENT/PREVIOUS VISIT EKG  Results for orders placed or performed during the hospital encounter of 07/06/20   EKG 12-lead    Collection Time: 07/06/20 12:39 PM    Narrative    Test Reason : Z01.Marino8,    Vent. Rate : 070 BPM     Atrial Rate : 070 BPM     P-R Int : 000 ms          QRS Dur : 182 ms      QT Int : 510 ms       P-R-T Axes : 000 -87 096 degrees     QTc Int : 550 ms    Ventricular-paced rhythm  Abnormal ECG  When compared with ECG of 07-JUN-2019 16:22,  Vent. rate has decreased BY   8 BPM  Confirmed by Madi Buckner MD ([id number]) on 7/7/2020 10:03:33 PM    Referred By:  BONY           Confirmed By:Madi Buckner MD     Results for orders placed in visit on 06/25/20   Echo Color Flow Doppler? Yes    Narrative · Concentric left ventricular remodeling.  · Mildly decreased left ventricular systolic function. The estimated   ejection fraction is 42%.  · Grade III (severe) left ventricular diastolic dysfunction consistent   with restrictive physiology.  · Local segmental wall motion abnormalities.  · Low normal right ventricular systolic function.  · Moderate left atrial enlargement.  · Moderate aortic regurgitation.  · Mild-to-moderate mitral regurgitation.  · Mild to moderate tricuspid regurgitation.  · Mild pulmonary hypertension present.  · Intermediate central venous pressure (8 mmHg).     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    PHYSICAL EXAM    Physical Exam   Blood pressure is 140/80 by me today  No carotid bruits  Lungs are clear  Cardiac S4 no S3  Abdomen is okay  Leg edema is better  Medication List with  Changes/Refills   Current Medications    AMLODIPINE (NORVASC) 5 MG TABLET    amlodipine 5 mg tablet   Take 1 tablet every day by oral route as directed.    ASPIRIN 81 MG CHEW    Take 1 tablet (81 mg total) by mouth once daily.    ATOMOXETINE (STRATTERA) 40 MG CAPSULE    Take 40 mg by mouth once daily.    CARVEDILOL (COREG) 6.25 MG TABLET    Take 6.25 mg by mouth 2 (two) times daily with meals.    CELECOXIB (CELEBREX) 100 MG CAPSULE    Take 100 mg by mouth once daily.    CLOPIDOGREL (PLAVIX) 75 MG TABLET    Take 1 tablet (75 mg total) by mouth once daily.    CYANOCOBALAMIN (VITAMIN B-12) 1000 MCG TABLET    Take 100 mcg by mouth once daily.    EZETIMIBE (ZETIA) 10 MG TABLET    Take 10 mg by mouth once daily.    FINASTERIDE (PROSCAR) 5 MG TABLET    Take 5 mg by mouth once daily.    FUROSEMIDE (LASIX) 20 MG TABLET    Take 20 mg by mouth every other day. 3 x week     GABAPENTIN (NEURONTIN) 300 MG CAPSULE    Take 300 mg by mouth 2 (two) times daily.    ISOSORBIDE MONONITRATE (IMDUR) 30 MG 24 HR TABLET    Take 30 mg by mouth once daily.    LISINOPRIL-HYDROCHLOROTHIAZIDE (PRINZIDE,ZESTORETIC) 10-12.5 MG PER TABLET    Take 1 tablet by mouth once daily.    MULTIVITAMIN W-MINERALS/LUTEIN (CENTRUM SILVER ORAL)    Take 1 tablet by mouth once daily.     NITROGLYCERIN (NITROSTAT) 0.4 MG SL TABLET    Place 0.4 mg under the tongue every 5 (five) minutes as needed for Chest pain. Seek medical help if pain is not relieved by the third dose.    OMEPRAZOLE (PRILOSEC) 20 MG CAPSULE    Take 40 mg by mouth once daily.      POTASSIUM CHLORIDE (MICRO-K) 10 MEQ CPSR    Take 20 mEq by mouth once daily.     PRAVASTATIN (PRAVACHOL) 20 MG TABLET    Take 20 mg by mouth once daily.    TERAZOSIN (HYTRIN) 5 MG CAPSULE    Take 5 mg by mouth 2 (two) times a day.    Discontinued Medications    SERTRALINE (ZOLOFT) 100 MG TABLET    Take 100 mg by mouth 2 (two) times daily.            Assessment:     ASCVD  Chronic kidney disease  Hypotension resolved      Plan:   Continue medicines including a chest spreadin medicine at over 24 hr    Problem List Items Addressed This Visit     None           No follow-ups on file.

## 2021-01-29 ENCOUNTER — TELEPHONE (OUTPATIENT)
Dept: FAMILY MEDICINE | Facility: CLINIC | Age: 86
End: 2021-01-29

## 2021-02-05 ENCOUNTER — HOSPITAL ENCOUNTER (OUTPATIENT)
Dept: CARDIOLOGY | Facility: CLINIC | Age: 86
Discharge: HOME | End: 2021-02-05
Attending: INTERNAL MEDICINE
Payer: MEDICARE

## 2021-02-05 DIAGNOSIS — I49.5 SSS (SICK SINUS SYNDROME): ICD-10-CM

## 2021-02-05 DIAGNOSIS — I25.10 ASCVD (ARTERIOSCLEROTIC CARDIOVASCULAR DISEASE): ICD-10-CM

## 2021-02-05 PROCEDURE — 93296 CARDIAC DEVICE CHECK - REMOTE: ICD-10-PCS | Mod: S$GLB,,, | Performed by: INTERNAL MEDICINE

## 2021-02-05 PROCEDURE — 93296 REM INTERROG EVL PM/IDS: CPT | Mod: S$GLB,,, | Performed by: INTERNAL MEDICINE

## 2021-02-05 PROCEDURE — 93294 REM INTERROG EVL PM/LDLS PM: CPT | Mod: S$GLB,,, | Performed by: INTERNAL MEDICINE

## 2021-02-05 PROCEDURE — 93294 CARDIAC DEVICE CHECK - REMOTE: ICD-10-PCS | Mod: S$GLB,,, | Performed by: INTERNAL MEDICINE

## 2021-02-10 ENCOUNTER — IMMUNIZATION (OUTPATIENT)
Dept: FAMILY MEDICINE | Facility: CLINIC | Age: 86
End: 2021-02-10
Payer: MEDICARE

## 2021-02-10 DIAGNOSIS — Z23 NEED FOR VACCINATION: Primary | ICD-10-CM

## 2021-02-10 PROCEDURE — 91300 COVID-19, MRNA, LNP-S, PF, 30 MCG/0.3 ML DOSE VACCINE: CPT | Mod: PBBFAC | Performed by: FAMILY MEDICINE

## 2021-02-16 DIAGNOSIS — I10 ESSENTIAL HYPERTENSION: ICD-10-CM

## 2021-02-16 RX ORDER — CELECOXIB 100 MG/1
100 CAPSULE ORAL DAILY
Qty: 90 CAPSULE | Refills: 3 | Status: SHIPPED | OUTPATIENT
Start: 2021-02-16 | End: 2021-12-14 | Stop reason: ALTCHOICE

## 2021-02-16 RX ORDER — CLOPIDOGREL BISULFATE 75 MG/1
75 TABLET ORAL DAILY
Qty: 90 TABLET | Refills: 3 | Status: SHIPPED | OUTPATIENT
Start: 2021-02-16 | End: 2022-02-16

## 2021-02-16 RX ORDER — GABAPENTIN 300 MG/1
300 CAPSULE ORAL 2 TIMES DAILY
Qty: 180 CAPSULE | Refills: 3 | Status: SHIPPED | OUTPATIENT
Start: 2021-02-16 | End: 2021-11-02

## 2021-02-16 RX ORDER — AMLODIPINE BESYLATE 5 MG/1
5 TABLET ORAL DAILY
Qty: 90 TABLET | Refills: 3 | Status: SHIPPED | OUTPATIENT
Start: 2021-02-16 | End: 2021-11-02 | Stop reason: ALTCHOICE

## 2021-02-16 RX ORDER — PRAVASTATIN SODIUM 20 MG/1
20 TABLET ORAL DAILY
Qty: 90 TABLET | Refills: 3 | Status: SHIPPED | OUTPATIENT
Start: 2021-02-16 | End: 2021-11-02

## 2021-02-16 RX ORDER — TERAZOSIN 5 MG/1
5 CAPSULE ORAL 2 TIMES DAILY
Qty: 90 CAPSULE | Refills: 3 | Status: SHIPPED | OUTPATIENT
Start: 2021-02-16 | End: 2022-04-21 | Stop reason: SDUPTHER

## 2021-02-16 RX ORDER — ATOMOXETINE 40 MG/1
40 CAPSULE ORAL DAILY
Qty: 90 CAPSULE | Refills: 3 | Status: SHIPPED | OUTPATIENT
Start: 2021-02-16 | End: 2021-11-02 | Stop reason: ALTCHOICE

## 2021-02-16 RX ORDER — FINASTERIDE 5 MG/1
5 TABLET, FILM COATED ORAL DAILY
Qty: 90 TABLET | Refills: 3 | Status: SHIPPED | OUTPATIENT
Start: 2021-02-16 | End: 2022-04-21 | Stop reason: SDUPTHER

## 2021-02-16 RX ORDER — AMLODIPINE BESYLATE 5 MG/1
5 TABLET ORAL DAILY
Qty: 90 TABLET | Refills: 3 | Status: SHIPPED | OUTPATIENT
Start: 2021-02-16 | End: 2021-02-16

## 2021-02-16 RX ORDER — LISINOPRIL AND HYDROCHLOROTHIAZIDE 10; 12.5 MG/1; MG/1
1 TABLET ORAL DAILY
Qty: 90 TABLET | Refills: 3 | Status: SHIPPED | OUTPATIENT
Start: 2021-02-16 | End: 2021-11-02 | Stop reason: ALTCHOICE

## 2021-02-16 RX ORDER — OMEPRAZOLE 20 MG/1
40 CAPSULE, DELAYED RELEASE ORAL DAILY
Qty: 180 CAPSULE | Refills: 3 | Status: SHIPPED | OUTPATIENT
Start: 2021-02-16 | End: 2021-11-02

## 2021-02-16 RX ORDER — ISOSORBIDE MONONITRATE 30 MG/1
30 TABLET, EXTENDED RELEASE ORAL DAILY
Qty: 90 TABLET | Refills: 3 | Status: SHIPPED | OUTPATIENT
Start: 2021-02-16

## 2021-02-16 RX ORDER — EZETIMIBE 10 MG/1
10 TABLET ORAL DAILY
Qty: 90 TABLET | Refills: 3 | Status: SHIPPED | OUTPATIENT
Start: 2021-02-16 | End: 2022-04-21 | Stop reason: SDUPTHER

## 2021-02-16 RX ORDER — POTASSIUM CHLORIDE 750 MG/1
20 CAPSULE, EXTENDED RELEASE ORAL DAILY
Qty: 180 CAPSULE | Refills: 3 | Status: SHIPPED | OUTPATIENT
Start: 2021-02-16 | End: 2021-12-09 | Stop reason: SDUPTHER

## 2021-02-16 RX ORDER — CARVEDILOL 6.25 MG/1
6.25 TABLET ORAL 2 TIMES DAILY WITH MEALS
Qty: 90 TABLET | Refills: 3 | Status: SHIPPED | OUTPATIENT
Start: 2021-02-16 | End: 2022-04-21 | Stop reason: SDUPTHER

## 2021-03-03 ENCOUNTER — IMMUNIZATION (OUTPATIENT)
Dept: FAMILY MEDICINE | Facility: CLINIC | Age: 86
End: 2021-03-03

## 2021-03-03 DIAGNOSIS — Z23 NEED FOR VACCINATION: Primary | ICD-10-CM

## 2021-03-03 PROCEDURE — 91300 COVID-19, MRNA, LNP-S, PF, 30 MCG/0.3 ML DOSE VACCINE: ICD-10-PCS | Mod: S$GLB,,, | Performed by: FAMILY MEDICINE

## 2021-03-03 PROCEDURE — 0002A COVID-19, MRNA, LNP-S, PF, 30 MCG/0.3 ML DOSE VACCINE: ICD-10-PCS | Mod: CV19,S$GLB,, | Performed by: FAMILY MEDICINE

## 2021-03-03 PROCEDURE — 0002A COVID-19, MRNA, LNP-S, PF, 30 MCG/0.3 ML DOSE VACCINE: CPT | Mod: CV19,S$GLB,, | Performed by: FAMILY MEDICINE

## 2021-03-03 PROCEDURE — 91300 COVID-19, MRNA, LNP-S, PF, 30 MCG/0.3 ML DOSE VACCINE: CPT | Mod: S$GLB,,, | Performed by: FAMILY MEDICINE

## 2021-03-10 DIAGNOSIS — I49.5 SSS (SICK SINUS SYNDROME): Primary | ICD-10-CM

## 2021-03-10 DIAGNOSIS — I25.10 ASCVD (ARTERIOSCLEROTIC CARDIOVASCULAR DISEASE): ICD-10-CM

## 2021-03-24 LAB
BATTERY VOLTAGE (V): 3.01 V
IMPEDANCE RA LEAD (NATIVE): 610 OHMS
IMPEDANCE RA LEAD: 450 OHMS
P/R-WAVE RA LEAD: 1.1 MV
THRESHOLD MS RA LEAD (NATIVE): 0.4 MS
THRESHOLD MS RA LEAD: 0.4 MS
THRESHOLD V RA LEAD (NATIVE): 0.75 V
THRESHOLD V RA LEAD: 1.75 V

## 2021-05-07 ENCOUNTER — HOSPITAL ENCOUNTER (OUTPATIENT)
Dept: CARDIOLOGY | Facility: CLINIC | Age: 86
Discharge: HOME | End: 2021-05-07
Attending: INTERNAL MEDICINE
Payer: MEDICARE

## 2021-05-07 DIAGNOSIS — I49.5 SSS (SICK SINUS SYNDROME): ICD-10-CM

## 2021-05-07 DIAGNOSIS — I49.5 SSS (SICK SINUS SYNDROME): Primary | ICD-10-CM

## 2021-05-07 PROCEDURE — 93296 CARDIAC DEVICE CHECK - REMOTE: ICD-10-PCS | Mod: S$GLB,,, | Performed by: INTERNAL MEDICINE

## 2021-05-07 PROCEDURE — 93294 REM INTERROG EVL PM/LDLS PM: CPT | Mod: S$GLB,,, | Performed by: INTERNAL MEDICINE

## 2021-05-07 PROCEDURE — 93296 REM INTERROG EVL PM/IDS: CPT | Mod: S$GLB,,, | Performed by: INTERNAL MEDICINE

## 2021-05-07 PROCEDURE — 93294 CARDIAC DEVICE CHECK - REMOTE: ICD-10-PCS | Mod: S$GLB,,, | Performed by: INTERNAL MEDICINE

## 2021-05-19 LAB
BATTERY VOLTAGE (V): 3.01 V
IMPEDANCE RA LEAD (NATIVE): 540 OHMS
IMPEDANCE RA LEAD: 440 OHMS
P/R-WAVE RA LEAD (NATIVE): >12 MV
P/R-WAVE RA LEAD: 0.8 MV
THRESHOLD MS RA LEAD (NATIVE): 0.4 MS
THRESHOLD MS RA LEAD: 0.4 MS
THRESHOLD V RA LEAD (NATIVE): 1 V
THRESHOLD V RA LEAD: 1.5 V

## 2021-05-31 ENCOUNTER — TELEPHONE (OUTPATIENT)
Dept: FAMILY MEDICINE | Facility: CLINIC | Age: 86
End: 2021-05-31

## 2021-06-11 ENCOUNTER — TELEPHONE (OUTPATIENT)
Dept: CARDIOLOGY | Facility: CLINIC | Age: 86
End: 2021-06-11

## 2021-06-17 ENCOUNTER — TELEPHONE (OUTPATIENT)
Dept: FAMILY MEDICINE | Facility: CLINIC | Age: 86
End: 2021-06-17

## 2021-06-17 DIAGNOSIS — I50.9 CONGESTIVE HEART FAILURE, UNSPECIFIED HF CHRONICITY, UNSPECIFIED HEART FAILURE TYPE: ICD-10-CM

## 2021-06-17 DIAGNOSIS — I63.9 CEREBROVASCULAR ACCIDENT (CVA), UNSPECIFIED MECHANISM: Primary | ICD-10-CM

## 2021-06-30 ENCOUNTER — TELEPHONE (OUTPATIENT)
Dept: FAMILY MEDICINE | Facility: CLINIC | Age: 86
End: 2021-06-30

## 2021-07-06 ENCOUNTER — OFFICE VISIT (OUTPATIENT)
Dept: FAMILY MEDICINE | Facility: CLINIC | Age: 86
End: 2021-07-06
Payer: MEDICARE

## 2021-07-06 VITALS
HEART RATE: 77 BPM | OXYGEN SATURATION: 93 % | TEMPERATURE: 98 F | HEIGHT: 66 IN | WEIGHT: 141 LBS | BODY MASS INDEX: 22.66 KG/M2 | SYSTOLIC BLOOD PRESSURE: 130 MMHG | DIASTOLIC BLOOD PRESSURE: 60 MMHG

## 2021-07-06 DIAGNOSIS — I51.9 LEFT VENTRICULAR DIASTOLIC DYSFUNCTION: ICD-10-CM

## 2021-07-06 DIAGNOSIS — Z98.890 S/P CAROTID ENDARTERECTOMY: ICD-10-CM

## 2021-07-06 DIAGNOSIS — I63.9 CEREBROVASCULAR ACCIDENT (CVA), UNSPECIFIED MECHANISM: ICD-10-CM

## 2021-07-06 DIAGNOSIS — Z95.1 S/P CABG X 3: Primary | ICD-10-CM

## 2021-07-06 PROCEDURE — 3288F PR FALLS RISK ASSESSMENT DOCUMENTED: ICD-10-PCS | Mod: CPTII,S$GLB,, | Performed by: FAMILY MEDICINE

## 2021-07-06 PROCEDURE — 1100F PTFALLS ASSESS-DOCD GE2>/YR: CPT | Mod: CPTII,S$GLB,, | Performed by: FAMILY MEDICINE

## 2021-07-06 PROCEDURE — 99213 PR OFFICE/OUTPT VISIT, EST, LEVL III, 20-29 MIN: ICD-10-PCS | Mod: S$GLB,,, | Performed by: FAMILY MEDICINE

## 2021-07-06 PROCEDURE — 99213 OFFICE O/P EST LOW 20 MIN: CPT | Mod: S$GLB,,, | Performed by: FAMILY MEDICINE

## 2021-07-06 PROCEDURE — 3288F FALL RISK ASSESSMENT DOCD: CPT | Mod: CPTII,S$GLB,, | Performed by: FAMILY MEDICINE

## 2021-07-06 PROCEDURE — 1159F MED LIST DOCD IN RCRD: CPT | Mod: S$GLB,,, | Performed by: FAMILY MEDICINE

## 2021-07-06 PROCEDURE — 1100F PR PT FALLS ASSESS DOC 2+ FALLS/FALL W/INJURY/YR: ICD-10-PCS | Mod: CPTII,S$GLB,, | Performed by: FAMILY MEDICINE

## 2021-07-06 PROCEDURE — 1159F PR MEDICATION LIST DOCUMENTED IN MEDICAL RECORD: ICD-10-PCS | Mod: S$GLB,,, | Performed by: FAMILY MEDICINE

## 2021-07-14 RX ORDER — SERTRALINE HYDROCHLORIDE 100 MG/1
2 TABLET, FILM COATED ORAL DAILY
COMMUNITY
End: 2021-07-14 | Stop reason: SDUPTHER

## 2021-07-15 RX ORDER — SERTRALINE HYDROCHLORIDE 100 MG/1
200 TABLET, FILM COATED ORAL DAILY
Qty: 60 TABLET | Refills: 5 | Status: SHIPPED | OUTPATIENT
Start: 2021-07-15 | End: 2022-04-21 | Stop reason: SDUPTHER

## 2021-08-06 ENCOUNTER — HOSPITAL ENCOUNTER (OUTPATIENT)
Dept: CARDIOLOGY | Facility: CLINIC | Age: 86
Discharge: HOME | End: 2021-08-06
Attending: INTERNAL MEDICINE
Payer: MEDICARE

## 2021-08-06 DIAGNOSIS — I49.5 SSS (SICK SINUS SYNDROME): ICD-10-CM

## 2021-08-06 PROCEDURE — 93296 CARDIAC DEVICE CHECK - REMOTE: ICD-10-PCS | Mod: S$GLB,,, | Performed by: INTERNAL MEDICINE

## 2021-08-06 PROCEDURE — 93294 CARDIAC DEVICE CHECK - REMOTE: ICD-10-PCS | Mod: S$GLB,,, | Performed by: INTERNAL MEDICINE

## 2021-08-06 PROCEDURE — 93294 REM INTERROG EVL PM/LDLS PM: CPT | Mod: S$GLB,,, | Performed by: INTERNAL MEDICINE

## 2021-08-06 PROCEDURE — 93296 REM INTERROG EVL PM/IDS: CPT | Mod: S$GLB,,, | Performed by: INTERNAL MEDICINE

## 2021-08-17 ENCOUNTER — OFFICE VISIT (OUTPATIENT)
Dept: FAMILY MEDICINE | Facility: CLINIC | Age: 86
End: 2021-08-17
Payer: MEDICARE

## 2021-08-17 VITALS — WEIGHT: 142 LBS | HEIGHT: 66 IN | BODY MASS INDEX: 22.82 KG/M2

## 2021-08-17 DIAGNOSIS — E11.22 TYPE 2 DIABETES MELLITUS WITH STAGE 3 CHRONIC KIDNEY DISEASE, WITHOUT LONG-TERM CURRENT USE OF INSULIN, UNSPECIFIED WHETHER STAGE 3A OR 3B CKD: ICD-10-CM

## 2021-08-17 DIAGNOSIS — N18.30 TYPE 2 DIABETES MELLITUS WITH STAGE 3 CHRONIC KIDNEY DISEASE, WITHOUT LONG-TERM CURRENT USE OF INSULIN, UNSPECIFIED WHETHER STAGE 3A OR 3B CKD: ICD-10-CM

## 2021-08-17 DIAGNOSIS — I67.89 CEREBRAL MICROVASCULAR DISEASE: Primary | ICD-10-CM

## 2021-08-17 DIAGNOSIS — E11.59 HYPERTENSION ASSOCIATED WITH DIABETES: ICD-10-CM

## 2021-08-17 DIAGNOSIS — N18.32 STAGE 3B CHRONIC KIDNEY DISEASE: ICD-10-CM

## 2021-08-17 DIAGNOSIS — I15.2 HYPERTENSION ASSOCIATED WITH DIABETES: ICD-10-CM

## 2021-08-17 LAB — GLUCOSE SERPL-MCNC: 99 MG/DL (ref 70–110)

## 2021-08-17 PROCEDURE — 99213 OFFICE O/P EST LOW 20 MIN: CPT | Mod: S$GLB,,, | Performed by: FAMILY MEDICINE

## 2021-08-17 PROCEDURE — 1159F PR MEDICATION LIST DOCUMENTED IN MEDICAL RECORD: ICD-10-PCS | Mod: CPTII,S$GLB,, | Performed by: FAMILY MEDICINE

## 2021-08-17 PROCEDURE — 1100F PTFALLS ASSESS-DOCD GE2>/YR: CPT | Mod: CPTII,S$GLB,, | Performed by: FAMILY MEDICINE

## 2021-08-17 PROCEDURE — 82962 POCT GLUCOSE, HAND-HELD DEVICE: ICD-10-PCS | Mod: ,,, | Performed by: FAMILY MEDICINE

## 2021-08-17 PROCEDURE — 99213 PR OFFICE/OUTPT VISIT, EST, LEVL III, 20-29 MIN: ICD-10-PCS | Mod: S$GLB,,, | Performed by: FAMILY MEDICINE

## 2021-08-17 PROCEDURE — 1125F AMNT PAIN NOTED PAIN PRSNT: CPT | Mod: CPTII,S$GLB,, | Performed by: FAMILY MEDICINE

## 2021-08-17 PROCEDURE — 1125F PR PAIN SEVERITY QUANTIFIED, PAIN PRESENT: ICD-10-PCS | Mod: CPTII,S$GLB,, | Performed by: FAMILY MEDICINE

## 2021-08-17 PROCEDURE — 1159F MED LIST DOCD IN RCRD: CPT | Mod: CPTII,S$GLB,, | Performed by: FAMILY MEDICINE

## 2021-08-17 PROCEDURE — 1100F PR PT FALLS ASSESS DOC 2+ FALLS/FALL W/INJURY/YR: ICD-10-PCS | Mod: CPTII,S$GLB,, | Performed by: FAMILY MEDICINE

## 2021-08-17 PROCEDURE — 3288F PR FALLS RISK ASSESSMENT DOCUMENTED: ICD-10-PCS | Mod: CPTII,S$GLB,, | Performed by: FAMILY MEDICINE

## 2021-08-17 PROCEDURE — 3288F FALL RISK ASSESSMENT DOCD: CPT | Mod: CPTII,S$GLB,, | Performed by: FAMILY MEDICINE

## 2021-08-17 PROCEDURE — 82962 GLUCOSE BLOOD TEST: CPT | Mod: ,,, | Performed by: FAMILY MEDICINE

## 2021-08-17 RX ORDER — FUROSEMIDE 20 MG/1
20 TABLET ORAL DAILY
Qty: 90 TABLET | Refills: 3 | Status: SHIPPED | OUTPATIENT
Start: 2021-08-17 | End: 2021-10-14 | Stop reason: SDUPTHER

## 2021-08-20 LAB
BATTERY VOLTAGE (V): 3.01 V
IMPEDANCE RA LEAD (NATIVE): 590 OHMS
IMPEDANCE RA LEAD: 440 OHMS
P/R-WAVE RA LEAD (NATIVE): >12 MV
P/R-WAVE RA LEAD: 1.2 MV
THRESHOLD MS RA LEAD (NATIVE): 0.4 MS
THRESHOLD MS RA LEAD: 0.4 MS
THRESHOLD V RA LEAD (NATIVE): 0.88 V
THRESHOLD V RA LEAD: 2 V

## 2021-09-10 ENCOUNTER — TELEPHONE (OUTPATIENT)
Dept: FAMILY MEDICINE | Facility: CLINIC | Age: 86
End: 2021-09-10

## 2021-09-13 ENCOUNTER — TELEPHONE (OUTPATIENT)
Dept: FAMILY MEDICINE | Facility: CLINIC | Age: 86
End: 2021-09-13

## 2021-10-14 ENCOUNTER — EXTERNAL HCC DOCUMENTATION (OUTPATIENT)
Dept: FAMILY MEDICINE | Facility: CLINIC | Age: 86
End: 2021-10-14
Payer: MEDICARE

## 2021-10-14 DIAGNOSIS — N18.32 STAGE 3B CHRONIC KIDNEY DISEASE: ICD-10-CM

## 2021-10-14 DIAGNOSIS — I63.9 CEREBROVASCULAR ACCIDENT (CVA), UNSPECIFIED MECHANISM: Primary | ICD-10-CM

## 2021-10-14 DIAGNOSIS — I51.9 LEFT VENTRICULAR DIASTOLIC DYSFUNCTION: ICD-10-CM

## 2021-10-14 RX ORDER — FUROSEMIDE 20 MG/1
20 TABLET ORAL DAILY
Qty: 90 TABLET | Refills: 3 | Status: SHIPPED | OUTPATIENT
Start: 2021-10-14 | End: 2021-12-14

## 2021-10-15 ENCOUNTER — TELEPHONE (OUTPATIENT)
Dept: FAMILY MEDICINE | Facility: CLINIC | Age: 86
End: 2021-10-15

## 2021-10-18 ENCOUNTER — TELEPHONE (OUTPATIENT)
Dept: FAMILY MEDICINE | Facility: CLINIC | Age: 86
End: 2021-10-18

## 2021-10-21 ENCOUNTER — TELEPHONE (OUTPATIENT)
Dept: FAMILY MEDICINE | Facility: CLINIC | Age: 86
End: 2021-10-21

## 2021-11-02 ENCOUNTER — LAB VISIT (OUTPATIENT)
Dept: LAB | Facility: CLINIC | Age: 86
End: 2021-11-02
Payer: MEDICARE

## 2021-11-02 ENCOUNTER — OFFICE VISIT (OUTPATIENT)
Dept: FAMILY MEDICINE | Facility: CLINIC | Age: 86
End: 2021-11-02
Payer: MEDICARE

## 2021-11-02 VITALS
HEIGHT: 66 IN | SYSTOLIC BLOOD PRESSURE: 120 MMHG | OXYGEN SATURATION: 97 % | DIASTOLIC BLOOD PRESSURE: 70 MMHG | TEMPERATURE: 98 F | BODY MASS INDEX: 22.18 KG/M2 | HEART RATE: 65 BPM | WEIGHT: 138 LBS

## 2021-11-02 DIAGNOSIS — Z95.1 S/P CABG X 3: ICD-10-CM

## 2021-11-02 DIAGNOSIS — I51.9 LEFT VENTRICULAR DIASTOLIC DYSFUNCTION: ICD-10-CM

## 2021-11-02 DIAGNOSIS — I67.89 CEREBRAL MICROVASCULAR DISEASE: Primary | ICD-10-CM

## 2021-11-02 DIAGNOSIS — Z98.890 S/P CAROTID ENDARTERECTOMY: ICD-10-CM

## 2021-11-02 LAB
ANION GAP SERPL CALC-SCNC: 13 MMOL/L (ref 8–16)
BUN SERPL-MCNC: 31 MG/DL (ref 8–23)
CALCIUM SERPL-MCNC: 8.4 MG/DL (ref 8.7–10.5)
CHLORIDE SERPL-SCNC: 108 MMOL/L (ref 95–110)
CO2 SERPL-SCNC: 22 MMOL/L (ref 23–29)
CREAT SERPL-MCNC: 1.1 MG/DL (ref 0.5–1.4)
EST. GFR  (AFRICAN AMERICAN): >60 ML/MIN/1.73 M^2
EST. GFR  (NON AFRICAN AMERICAN): >60 ML/MIN/1.73 M^2
GLUCOSE SERPL-MCNC: 103 MG/DL (ref 70–110)
POTASSIUM SERPL-SCNC: 3.8 MMOL/L (ref 3.5–5.1)
SODIUM SERPL-SCNC: 143 MMOL/L (ref 136–145)

## 2021-11-02 PROCEDURE — 36415 PR COLLECTION VENOUS BLOOD,VENIPUNCTURE: ICD-10-PCS | Mod: ,,, | Performed by: FAMILY MEDICINE

## 2021-11-02 PROCEDURE — 1159F MED LIST DOCD IN RCRD: CPT | Mod: CPTII,S$GLB,, | Performed by: FAMILY MEDICINE

## 2021-11-02 PROCEDURE — 1125F PR PAIN SEVERITY QUANTIFIED, PAIN PRESENT: ICD-10-PCS | Mod: CPTII,S$GLB,, | Performed by: FAMILY MEDICINE

## 2021-11-02 PROCEDURE — 1100F PR PT FALLS ASSESS DOC 2+ FALLS/FALL W/INJURY/YR: ICD-10-PCS | Mod: CPTII,S$GLB,, | Performed by: FAMILY MEDICINE

## 2021-11-02 PROCEDURE — 80048 BASIC METABOLIC PNL TOTAL CA: CPT | Performed by: FAMILY MEDICINE

## 2021-11-02 PROCEDURE — 3288F FALL RISK ASSESSMENT DOCD: CPT | Mod: CPTII,S$GLB,, | Performed by: FAMILY MEDICINE

## 2021-11-02 PROCEDURE — 36415 COLL VENOUS BLD VENIPUNCTURE: CPT | Mod: ,,, | Performed by: FAMILY MEDICINE

## 2021-11-02 PROCEDURE — 99214 OFFICE O/P EST MOD 30 MIN: CPT | Mod: S$GLB,,, | Performed by: FAMILY MEDICINE

## 2021-11-02 PROCEDURE — 1125F AMNT PAIN NOTED PAIN PRSNT: CPT | Mod: CPTII,S$GLB,, | Performed by: FAMILY MEDICINE

## 2021-11-02 PROCEDURE — 1100F PTFALLS ASSESS-DOCD GE2>/YR: CPT | Mod: CPTII,S$GLB,, | Performed by: FAMILY MEDICINE

## 2021-11-02 PROCEDURE — 3288F PR FALLS RISK ASSESSMENT DOCUMENTED: ICD-10-PCS | Mod: CPTII,S$GLB,, | Performed by: FAMILY MEDICINE

## 2021-11-02 PROCEDURE — 99214 PR OFFICE/OUTPT VISIT, EST, LEVL IV, 30-39 MIN: ICD-10-PCS | Mod: S$GLB,,, | Performed by: FAMILY MEDICINE

## 2021-11-02 PROCEDURE — 1159F PR MEDICATION LIST DOCUMENTED IN MEDICAL RECORD: ICD-10-PCS | Mod: CPTII,S$GLB,, | Performed by: FAMILY MEDICINE

## 2021-11-02 RX ORDER — PANTOPRAZOLE SODIUM 20 MG/1
20 TABLET, DELAYED RELEASE ORAL DAILY
Qty: 90 TABLET | Refills: 3
Start: 2021-11-02 | End: 2022-11-02

## 2021-11-05 ENCOUNTER — HOSPITAL ENCOUNTER (OUTPATIENT)
Dept: CARDIOLOGY | Facility: CLINIC | Age: 86
Discharge: HOME | End: 2021-11-05
Attending: INTERNAL MEDICINE
Payer: MEDICARE

## 2021-11-05 PROCEDURE — 93296 CARDIAC DEVICE CHECK - REMOTE: ICD-10-PCS | Mod: S$GLB,,, | Performed by: INTERNAL MEDICINE

## 2021-11-05 PROCEDURE — 93296 REM INTERROG EVL PM/IDS: CPT | Mod: S$GLB,,, | Performed by: INTERNAL MEDICINE

## 2021-11-05 PROCEDURE — 93294 REM INTERROG EVL PM/LDLS PM: CPT | Mod: S$GLB,,, | Performed by: INTERNAL MEDICINE

## 2021-11-05 PROCEDURE — 93294 CARDIAC DEVICE CHECK - REMOTE: ICD-10-PCS | Mod: S$GLB,,, | Performed by: INTERNAL MEDICINE

## 2021-11-08 ENCOUNTER — TELEPHONE (OUTPATIENT)
Dept: FAMILY MEDICINE | Facility: CLINIC | Age: 86
End: 2021-11-08
Payer: MEDICARE

## 2021-11-18 ENCOUNTER — TELEPHONE (OUTPATIENT)
Dept: FAMILY MEDICINE | Facility: CLINIC | Age: 86
End: 2021-11-18
Payer: MEDICARE

## 2021-11-18 RX ORDER — OMEPRAZOLE 20 MG/1
1 CAPSULE, DELAYED RELEASE ORAL DAILY
COMMUNITY
Start: 2021-02-16

## 2021-11-18 RX ORDER — PANTOPRAZOLE SODIUM 20 MG/1
20 TABLET, DELAYED RELEASE ORAL DAILY
Qty: 90 TABLET | Refills: 3 | Status: CANCELLED | OUTPATIENT
Start: 2021-11-18 | End: 2022-11-18

## 2021-11-29 ENCOUNTER — TELEPHONE (OUTPATIENT)
Dept: FAMILY MEDICINE | Facility: CLINIC | Age: 86
End: 2021-11-29
Payer: MEDICARE

## 2021-12-10 DIAGNOSIS — I50.9 CONGESTIVE HEART FAILURE, UNSPECIFIED HF CHRONICITY, UNSPECIFIED HEART FAILURE TYPE: Primary | ICD-10-CM

## 2021-12-10 DIAGNOSIS — D64.9 ANEMIA, UNSPECIFIED TYPE: ICD-10-CM

## 2021-12-11 RX ORDER — POTASSIUM CHLORIDE 750 MG/1
20 CAPSULE, EXTENDED RELEASE ORAL DAILY
Qty: 180 CAPSULE | Refills: 0 | Status: SHIPPED | OUTPATIENT
Start: 2021-12-11 | End: 2022-04-21 | Stop reason: SDUPTHER

## 2021-12-14 ENCOUNTER — TELEPHONE (OUTPATIENT)
Dept: FAMILY MEDICINE | Facility: CLINIC | Age: 86
End: 2021-12-14
Payer: MEDICARE

## 2021-12-14 DIAGNOSIS — R39.9 UTI SYMPTOMS: ICD-10-CM

## 2021-12-14 DIAGNOSIS — I51.9 LEFT VENTRICULAR DIASTOLIC DYSFUNCTION: ICD-10-CM

## 2021-12-14 DIAGNOSIS — I50.9 CONGESTIVE HEART FAILURE, UNSPECIFIED HF CHRONICITY, UNSPECIFIED HEART FAILURE TYPE: Primary | ICD-10-CM

## 2021-12-14 RX ORDER — FUROSEMIDE 20 MG/1
40 TABLET ORAL DAILY
Qty: 90 TABLET | Refills: 3 | Status: CANCELLED | OUTPATIENT
Start: 2021-12-14

## 2021-12-16 RX ORDER — FUROSEMIDE 20 MG/1
40 TABLET ORAL DAILY
Qty: 90 TABLET | Refills: 3 | Status: SHIPPED | OUTPATIENT
Start: 2021-12-16

## 2022-04-21 RX ORDER — CARVEDILOL 6.25 MG/1
6.25 TABLET ORAL 2 TIMES DAILY WITH MEALS
Qty: 180 TABLET | Refills: 3 | Status: SHIPPED | OUTPATIENT
Start: 2022-04-21

## 2022-04-21 RX ORDER — EZETIMIBE 10 MG/1
10 TABLET ORAL DAILY
Qty: 90 TABLET | Refills: 3 | Status: SHIPPED | OUTPATIENT
Start: 2022-04-21

## 2022-04-21 RX ORDER — FINASTERIDE 5 MG/1
5 TABLET, FILM COATED ORAL DAILY
Qty: 90 TABLET | Refills: 3 | Status: SHIPPED | OUTPATIENT
Start: 2022-04-21

## 2022-04-21 RX ORDER — SERTRALINE HYDROCHLORIDE 100 MG/1
200 TABLET, FILM COATED ORAL DAILY
Qty: 180 TABLET | Refills: 3 | Status: SHIPPED | OUTPATIENT
Start: 2022-04-21 | End: 2022-10-18

## 2022-04-21 RX ORDER — POTASSIUM CHLORIDE 750 MG/1
20 CAPSULE, EXTENDED RELEASE ORAL DAILY
Qty: 180 CAPSULE | Refills: 3 | Status: SHIPPED | OUTPATIENT
Start: 2022-04-21

## 2022-04-21 RX ORDER — TERAZOSIN 5 MG/1
5 CAPSULE ORAL 2 TIMES DAILY
Qty: 90 CAPSULE | Refills: 3 | Status: SHIPPED | OUTPATIENT
Start: 2022-04-21

## 2022-04-21 NOTE — TELEPHONE ENCOUNTER
Checked his chart, pt needs these refilled        ----- Message from Keturah Eng sent at 4/21/2022  1:40 PM CDT -----  Regarding: refills  Long time patient of Dr Mo ---Patient needs refills on meds, not sure which ones but he states one is Zoloft ---please call pt at [phone]--patient uses "[facility] [place]

## 2022-05-05 ENCOUNTER — TELEPHONE (OUTPATIENT)
Dept: FAMILY MEDICINE | Facility: CLINIC | Age: 87
End: 2022-05-05

## 2022-05-05 NOTE — TELEPHONE ENCOUNTER
Please call pt,when you can.        ----- Message from Viviana Butler MA sent at 5/5/2022  3:48 PM CDT -----  Contact: pt  Type: Needs Medical Advice    Who Called:FLORIDALMA Abdi JR. [[id number]  Best Call Back Number: [phone]  Inquiry/Question: Would you kindly call FLORIDALMA HAMM JR. [[id number] regarding  chest pains and 2 to 3 times a week taking 2 meds to resolve it   Thank you~

## 2022-08-08 ENCOUNTER — TELEPHONE (OUTPATIENT)
Dept: CARDIOLOGY | Facility: CLINIC | Age: 87
End: 2022-08-08
Payer: MEDICARE

## 2022-08-08 DIAGNOSIS — I49.5 SSS (SICK SINUS SYNDROME): Primary | ICD-10-CM

## 2022-08-10 ENCOUNTER — TELEPHONE (OUTPATIENT)
Dept: CARDIOLOGY | Facility: CLINIC | Age: 87
End: 2022-08-10

## 2022-08-10 NOTE — TELEPHONE ENCOUNTER
----- Message from Galilea Young sent at 8/10/2022  8:00 AM CDT -----  Called last night.  Very confused.  Please call.  No portal message.  [phone]

## 2022-08-18 DIAGNOSIS — I49.5 SICK SINUS SYNDROME: Primary | ICD-10-CM

## 2022-10-31 RX ORDER — NEOMYCIN/POLYMYXIN B/HYDROCORT 3.5-10K-1
1 SUSPENSION, DROPS(FINAL DOSAGE FORM)(ML) OPHTHALMIC (EYE) 4 TIMES DAILY
Qty: 10 ML | Refills: 1 | Status: SHIPPED | OUTPATIENT
Start: 2022-10-31

## 2023-02-23 ENCOUNTER — TELEPHONE (OUTPATIENT)
Dept: CARDIOLOGY | Facility: CLINIC | Age: 88
End: 2023-02-23
Payer: MEDICARE

## 2023-02-23 DIAGNOSIS — Z95.0 CARDIAC PACEMAKER IN SITU: Primary | ICD-10-CM

## 2023-05-31 DIAGNOSIS — I63.9 CEREBROVASCULAR ACCIDENT (CVA), UNSPECIFIED MECHANISM: Primary | ICD-10-CM

## 2023-09-25 DIAGNOSIS — Z95.0 CARDIAC PACEMAKER IN SITU: Primary | ICD-10-CM

## (undated) DEVICE — CATHETER BIPOLAR PACING 5FR J-TIP

## (undated) DEVICE — ADAPTER PIN INDEXED SWAN GANZ

## (undated) DEVICE — SHEATH PINNACLE 6FRX10CM W/GUIDEWIRE

## (undated) DEVICE — ELECTROCAUTERY PHOTONBLADE

## (undated) DEVICE — CABLE PACING FAS-LOC SECURITY